# Patient Record
Sex: FEMALE | Race: ASIAN | NOT HISPANIC OR LATINO | Employment: UNEMPLOYED | URBAN - METROPOLITAN AREA
[De-identification: names, ages, dates, MRNs, and addresses within clinical notes are randomized per-mention and may not be internally consistent; named-entity substitution may affect disease eponyms.]

---

## 2023-07-26 LAB
CHLAMYDIA TRACH., AMPLIFIED: NEGATIVE
N. GONORRHEA, AMPLIFIED: NEGATIVE

## 2023-07-27 LAB — URINE CULTURE: NO GROWTH

## 2023-08-02 LAB
ABO GROUP (TYPE) IN BLOOD: NORMAL
ANTIBODY SCREEN: NORMAL
ERYTHROCYTE DISTRIBUTION WIDTH (RATIO) BY AUTOMATED COUNT: 11.5 % (ref 11.5–14.5)
ERYTHROCYTE MEAN CORPUSCULAR HEMOGLOBIN CONCENTRATION (G/DL) BY AUTOMATED: 33.3 G/DL (ref 32–36)
ERYTHROCYTE MEAN CORPUSCULAR VOLUME (FL) BY AUTOMATED COUNT: 98 FL (ref 80–100)
ERYTHROCYTES (10*6/UL) IN BLOOD BY AUTOMATED COUNT: 3.78 X10E12/L (ref 4–5.2)
HEMATOCRIT (%) IN BLOOD BY AUTOMATED COUNT: 36.9 % (ref 36–46)
HEMOGLOBIN (G/DL) IN BLOOD: 12.3 G/DL (ref 12–16)
HEPATITIS B VIRUS SURFACE AG PRESENCE IN SERUM: NONREACTIVE
HEPATITIS C VIRUS AB PRESENCE IN SERUM: NONREACTIVE
HIV 1/ 2 AG/AB SCREEN: NONREACTIVE
LEUKOCYTES (10*3/UL) IN BLOOD BY AUTOMATED COUNT: 9.5 X10E9/L (ref 4.4–11.3)
NRBC (PER 100 WBCS) BY AUTOMATED COUNT: 0 /100 WBC (ref 0–0)
PLATELETS (10*3/UL) IN BLOOD AUTOMATED COUNT: 216 X10E9/L (ref 150–450)
REFLEX ADDED, ANEMIA PANEL: ABNORMAL
RH FACTOR: NORMAL
RUBELLA VIRUS IGG AB: POSITIVE
SYPHILIS TOTAL AB: NONREACTIVE

## 2023-08-03 LAB
CHLAMYDIA TRACH., AMPLIFIED: NEGATIVE
N. GONORRHEA, AMPLIFIED: NEGATIVE

## 2023-08-07 LAB
HEMOGLOBIN A2: 2.8 %
HEMOGLOBIN A: 96.8 %
HEMOGLOBIN F: 0.4 %
HEMOGLOBIN IDENTIFICATION INTERPRETATION: NORMAL
PATH REVIEW-HGB IDENTIFICATION: NORMAL

## 2023-10-02 ENCOUNTER — ANCILLARY PROCEDURE (OUTPATIENT)
Dept: RADIOLOGY | Facility: CLINIC | Age: 28
End: 2023-10-02
Payer: COMMERCIAL

## 2023-10-02 DIAGNOSIS — Z32.01 PREGNANCY TEST POSITIVE (HHS-HCC): ICD-10-CM

## 2023-10-02 PROCEDURE — 76811 OB US DETAILED SNGL FETUS: CPT

## 2023-10-02 PROCEDURE — 76811 OB US DETAILED SNGL FETUS: CPT | Performed by: OBSTETRICS & GYNECOLOGY

## 2023-10-30 ENCOUNTER — ROUTINE PRENATAL (OUTPATIENT)
Dept: OBSTETRICS AND GYNECOLOGY | Facility: CLINIC | Age: 28
End: 2023-10-30
Payer: COMMERCIAL

## 2023-10-30 VITALS — WEIGHT: 154.6 LBS | BODY MASS INDEX: 27.39 KG/M2 | SYSTOLIC BLOOD PRESSURE: 120 MMHG | DIASTOLIC BLOOD PRESSURE: 68 MMHG

## 2023-10-30 DIAGNOSIS — Z34.02 ENCOUNTER FOR SUPERVISION OF NORMAL FIRST PREGNANCY IN SECOND TRIMESTER (HHS-HCC): Primary | ICD-10-CM

## 2023-10-30 DIAGNOSIS — N89.8 VAGINAL IRRITATION: ICD-10-CM

## 2023-10-30 PROCEDURE — 0501F PRENATAL FLOW SHEET: CPT | Performed by: OBSTETRICS & GYNECOLOGY

## 2023-10-30 PROCEDURE — 87205 SMEAR GRAM STAIN: CPT

## 2023-10-30 RX ORDER — FLUCONAZOLE 150 MG/1
150 TABLET ORAL ONCE
Qty: 2 TABLET | Refills: 0 | Status: SHIPPED | OUTPATIENT
Start: 2023-10-30 | End: 2023-10-30

## 2023-10-30 NOTE — PROGRESS NOTES
OB Follow up visit    ASSESSMENT & PLAN    Elda Sadler is a 28 y.o.  at 23w1d presenting for routine prenatal care    Problem List Items Addressed This Visit       Encounter for supervision of normal first pregnancy in second trimester - Primary    Overview     [x] Dating: LMP c/w 13 week ultrasound  [x] Initial BMI: 24  [x] Prenatal Labs: Rh+, Hgb 12.3, rubella immune  [x] Pap: ASCUS/HPV neg in 2023  [x] Aneuploidy Screening:  rr cfDNA  [x] Baby ASA: No  [x] Anatomy US: Normal  [] 1hr GCT at 24-28wks:   [] Tdap (27-36wks):  [x] Flu Shot: done  [x] COVID vaccine: done with primary vaccine series, declines booster  [] GBS at 36 wks:  [x] Breastfeeding: Yes  [x] PPBC: Planning for POP at 6 week visit  [] 39 weeks discussion of IOL vs. Expectant management:  [] Mode of delivery:            Current Assessment & Plan     GTT/syphilis/CBC ordered         Relevant Orders    CBC Anemia Panel With Reflex,Pregnancy    Glucose, 1 Hour Screen, Pregnancy    Syphilis Screen with Reflex    Vaginal irritation    Current Assessment & Plan     Exam consistent with yeast infection. Rx sent for diflucan. Vaginitis swab collected.         Relevant Medications    fluconazole (Diflucan) 150 mg tablet    Other Relevant Orders    Vaginitis Gram Stain For Bacterial Vaginosis + Yeast        Orders Placed This Encounter   Procedures    Vaginitis Gram Stain For Bacterial Vaginosis + Yeast     Order Specific Question:   Release result to MyChart     Answer:   Immediate [1]    CBC Anemia Panel With Reflex,Pregnancy     Standing Status:   Future     Standing Expiration Date:   10/30/2024     Order Specific Question:   Release result to MyChart     Answer:   Immediate [1]    Glucose, 1 Hour Screen, Pregnancy     Standing Status:   Future     Standing Expiration Date:   10/30/2024     Order Specific Question:   Release result to MyChart     Answer:   Immediate [1]    Syphilis Screen with Reflex     Standing Status:   Future      Standing Expiration Date:   10/30/2024     Order Specific Question:   Release result to [employer]     Answer:   Immediate [1]        RTC in 4 weeks    SUBJECTIVE    HPI: Elda Sadler is a 28 y.o.  at 23w1d here for RPNV. Denies contractions, bleeding, or LOF. Reports normal fetal movement. Patient reports vaginal itching on inner labia.     OBJECTIVE    Visit Vitals  /68   Wt 70.1 kg (154 lb 9.6 oz)   LMP 2023   BMI 27.39 kg/m²   OB Status Pregnant   Smoking Status Never   BSA 1.77 m²      Expected Total Weight Gain: 11.5 kg (25 lb)-16 kg (35 lb)   Pregravid BMI: 24.45    Michelle Bartlett MD

## 2023-10-31 LAB
CLUE CELLS VAG LPF-#/AREA: ABNORMAL /[LPF]
NUGENT SCORE: 1
YEAST VAG WET PREP-#/AREA: PRESENT

## 2023-11-28 ENCOUNTER — ROUTINE PRENATAL (OUTPATIENT)
Dept: OBSTETRICS AND GYNECOLOGY | Facility: CLINIC | Age: 28
End: 2023-11-28
Payer: COMMERCIAL

## 2023-11-28 ENCOUNTER — LAB (OUTPATIENT)
Dept: LAB | Facility: LAB | Age: 28
End: 2023-11-28
Payer: COMMERCIAL

## 2023-11-28 VITALS — DIASTOLIC BLOOD PRESSURE: 58 MMHG | WEIGHT: 162 LBS | BODY MASS INDEX: 28.7 KG/M2 | SYSTOLIC BLOOD PRESSURE: 116 MMHG

## 2023-11-28 DIAGNOSIS — Z23 NEED FOR DIPHTHERIA-TETANUS-PERTUSSIS (TDAP) VACCINE: Primary | ICD-10-CM

## 2023-11-28 DIAGNOSIS — Z34.02 ENCOUNTER FOR SUPERVISION OF NORMAL FIRST PREGNANCY IN SECOND TRIMESTER (HHS-HCC): ICD-10-CM

## 2023-11-28 PROBLEM — N89.8 VAGINAL IRRITATION: Status: RESOLVED | Noted: 2023-10-30 | Resolved: 2023-11-28

## 2023-11-28 LAB
ERYTHROCYTE [DISTWIDTH] IN BLOOD BY AUTOMATED COUNT: 11.8 % (ref 11.5–14.5)
GLUCOSE 1H P 50 G GLC PO SERPL-MCNC: 59 MG/DL
HCT VFR BLD AUTO: 34.1 % (ref 36–46)
HGB BLD-MCNC: 11.1 G/DL (ref 12–16)
MCH RBC QN AUTO: 32.8 PG (ref 26–34)
MCHC RBC AUTO-ENTMCNC: 32.6 G/DL (ref 32–36)
MCV RBC AUTO: 101 FL (ref 80–100)
NRBC BLD-RTO: 0 /100 WBCS (ref 0–0)
PLATELET # BLD AUTO: 194 X10*3/UL (ref 150–450)
RBC # BLD AUTO: 3.38 X10*6/UL (ref 4–5.2)
REFLEX ADDED, ANEMIA PANEL: NORMAL
T PALLIDUM AB SER QL: NONREACTIVE
WBC # BLD AUTO: 8.5 X10*3/UL (ref 4.4–11.3)

## 2023-11-28 PROCEDURE — 90715 TDAP VACCINE 7 YRS/> IM: CPT | Performed by: OBSTETRICS & GYNECOLOGY

## 2023-11-28 PROCEDURE — 36415 COLL VENOUS BLD VENIPUNCTURE: CPT

## 2023-11-28 PROCEDURE — 82947 ASSAY GLUCOSE BLOOD QUANT: CPT

## 2023-11-28 PROCEDURE — 85027 COMPLETE CBC AUTOMATED: CPT

## 2023-11-28 PROCEDURE — 90471 IMMUNIZATION ADMIN: CPT | Performed by: OBSTETRICS & GYNECOLOGY

## 2023-11-28 PROCEDURE — 0501F PRENATAL FLOW SHEET: CPT | Performed by: OBSTETRICS & GYNECOLOGY

## 2023-11-28 PROCEDURE — 86780 TREPONEMA PALLIDUM: CPT

## 2023-11-28 NOTE — PROGRESS NOTES
OB Follow up visit    ASSESSMENT & PLAN    Elda Sadler is a 28 y.o.  at 27w2d presenting for routine prenatal care    Problem List Items Addressed This Visit       Encounter for supervision of normal first pregnancy in second trimester    Overview     [x] Dating: LMP c/w 13 week ultrasound  [x] Initial BMI: 24  [x] Prenatal Labs: Rh+, Hgb 12.3, rubella immune  [x] Pap: ASCUS/HPV neg in 2023  [x] Aneuploidy Screening:  rr cfDNA  [x] Baby ASA: No  [x] Anatomy US: Normal  [x] 1hr GCT at 24-28wks: 59  [x] Tdap (27-36wks): 23  [x] Flu Shot: done  [x] COVID vaccine: done with primary vaccine series, declines booster  [] GBS at 36 wks:  [x] Breastfeeding: Yes  [x] PPBC: Planning for POP at 6 week visit  [] 39 weeks discussion of IOL vs. Expectant management:  [] Mode of delivery:             Other Visit Diagnoses       Need for diphtheria-tetanus-pertussis (Tdap) vaccine    -  Primary    Relevant Orders    Tdap vaccine, age 7 years and older (Completed)             Orders Placed This Encounter   Procedures    Tdap vaccine, age 7 years and older    CBC     Order Specific Question:   Release result to [employer]     Answer:   Immediate [1]    CBC Anemia Panel with Reflex, Pregnancy     Order Specific Question:   Release result to [employer]     Answer:   Immediate [1]        RTC in 3 weeks      SUBJECTIVE    HPI: Elda Sadler is a 28 y.o.  at 27w2d here for RPNV. Denies contractions, bleeding, or LOF. Reports normal fetal movement. Patient without complaints.    OBJECTIVE    Visit Vitals  /58   Wt 73.5 kg (162 lb)   LMP 2023   BMI 28.70 kg/m²   OB Status Pregnant   Smoking Status Never   BSA 1.81 m²        Michelle Bartlett MD

## 2023-12-19 ENCOUNTER — ROUTINE PRENATAL (OUTPATIENT)
Dept: OBSTETRICS AND GYNECOLOGY | Facility: CLINIC | Age: 28
End: 2023-12-19
Payer: COMMERCIAL

## 2023-12-19 VITALS — BODY MASS INDEX: 29.23 KG/M2 | DIASTOLIC BLOOD PRESSURE: 82 MMHG | SYSTOLIC BLOOD PRESSURE: 122 MMHG | WEIGHT: 165 LBS

## 2023-12-19 DIAGNOSIS — Z34.02 ENCOUNTER FOR SUPERVISION OF NORMAL FIRST PREGNANCY IN SECOND TRIMESTER (HHS-HCC): ICD-10-CM

## 2023-12-19 PROCEDURE — 0501F PRENATAL FLOW SHEET: CPT | Performed by: OBSTETRICS & GYNECOLOGY

## 2023-12-19 NOTE — PROGRESS NOTES
Bed: 09  Expected date:   Expected time:   Means of arrival: Personal Transportation  Comments:   OB Follow up visit    ASSESSMENT & PLAN    Elda Sadler is a 28 y.o.  at 30w2d presenting for routine prenatal care    Problem List Items Addressed This Visit       Encounter for supervision of normal first pregnancy in second trimester    Overview     [x] Dating: LMP c/w 13 week ultrasound  [x] Initial BMI: 24  [x] Prenatal Labs: Rh+, Hgb 12.3, rubella immune  [x] Pap: ASCUS/HPV neg in 2023  [x] Aneuploidy Screening:  rr cfDNA  [x] Baby ASA: No  [x] Anatomy US: Normal  [x] 1hr GCT at 24-28wks: 59  [x] Tdap (27-36wks): 23  [x] Flu Shot: done  [x] COVID vaccine: done with primary vaccine series, declines booster  [] GBS at 36 wks:  [x] Breastfeeding: Yes  [x] PPBC: Planning for POP at 6 week visit  [] 39 weeks discussion of IOL vs. Expectant management:  [] Mode of delivery:              RTC in 4 weeks      SUBJECTIVE    HPI: Elda Sadler is a 28 y.o.  at 30w2d here for RPNV. Denies contractions, bleeding, or LOF. Reports normal fetal movement. Patient reports heartburn. Has pepcid at home to try.      OBJECTIVE    Visit Vitals  /82   Wt 74.8 kg (165 lb)   LMP 2023   BMI 29.23 kg/m²   OB Status Pregnant   Smoking Status Never   BSA 1.82 m²        Michelle Bartlett MD

## 2024-01-02 ENCOUNTER — APPOINTMENT (OUTPATIENT)
Dept: OBSTETRICS AND GYNECOLOGY | Facility: CLINIC | Age: 29
End: 2024-01-02
Payer: COMMERCIAL

## 2024-01-05 ENCOUNTER — ROUTINE PRENATAL (OUTPATIENT)
Dept: OBSTETRICS AND GYNECOLOGY | Facility: CLINIC | Age: 29
End: 2024-01-05
Payer: COMMERCIAL

## 2024-01-05 VITALS — WEIGHT: 168 LBS | DIASTOLIC BLOOD PRESSURE: 60 MMHG | BODY MASS INDEX: 29.76 KG/M2 | SYSTOLIC BLOOD PRESSURE: 106 MMHG

## 2024-01-05 DIAGNOSIS — Z34.02 ENCOUNTER FOR SUPERVISION OF NORMAL FIRST PREGNANCY IN SECOND TRIMESTER (HHS-HCC): Primary | ICD-10-CM

## 2024-01-05 PROCEDURE — 0501F PRENATAL FLOW SHEET: CPT | Performed by: OBSTETRICS & GYNECOLOGY

## 2024-01-05 NOTE — PROGRESS NOTES
OB Follow up visit    ASSESSMENT & PLAN    Elda Sadler is a 28 y.o.  at 32w5d presenting for routine prenatal care    Problem List Items Addressed This Visit       Encounter for supervision of normal first pregnancy in second trimester - Primary    Overview     [x] Dating: LMP c/w 13 week ultrasound  [x] Initial BMI: 24  [x] Prenatal Labs: Rh+, Hgb 12.3, rubella immune  [x] Pap: ASCUS/HPV neg in 2023  [x] Aneuploidy Screening:  rr cfDNA  [x] Baby ASA: No  [x] Anatomy US: Normal  [x] 1hr GCT at 24-28wks: 59  [x] Tdap (27-36wks): 23  [x] Flu Shot: done  [x] COVID vaccine: done with primary vaccine series, declines booster  [] GBS at 36 wks:  [x] Breastfeeding: Yes  [x] PPBC: Planning for POP at 6 week visit  [] 39 weeks discussion of IOL vs. Expectant management: Discussed, may want IOL around NIYA  [x] Mode of delivery: Plan for            Relevant Orders    US MAC OB imaging order      Measuring slightly large - growth ultrasound ordered  Has a childbirth class tomorrow  RTC in 2 weeks      SUBJECTIVE    HPI: Elda Sadler is a 28 y.o.  at 32w5d here for RPNV. Denies contractions, bleeding, or LOF. Reports normal fetal movement. No new concerns today     OBJECTIVE    Visit Vitals  /60   Wt 76.2 kg (168 lb)   LMP 2023   BMI 29.76 kg/m²   OB Status Pregnant   Smoking Status Never   BSA 1.84 m²        Michelle Bartlett MD

## 2024-01-15 ENCOUNTER — ANCILLARY PROCEDURE (OUTPATIENT)
Dept: RADIOLOGY | Facility: CLINIC | Age: 29
End: 2024-01-15
Payer: COMMERCIAL

## 2024-01-15 DIAGNOSIS — Z34.90 PREGNANT (HHS-HCC): ICD-10-CM

## 2024-01-15 PROCEDURE — 76816 OB US FOLLOW-UP PER FETUS: CPT | Performed by: OBSTETRICS & GYNECOLOGY

## 2024-01-15 PROCEDURE — 76819 FETAL BIOPHYS PROFIL W/O NST: CPT | Performed by: OBSTETRICS & GYNECOLOGY

## 2024-01-15 PROCEDURE — 76816 OB US FOLLOW-UP PER FETUS: CPT

## 2024-01-16 ENCOUNTER — APPOINTMENT (OUTPATIENT)
Dept: OBSTETRICS AND GYNECOLOGY | Facility: CLINIC | Age: 29
End: 2024-01-16
Payer: COMMERCIAL

## 2024-01-19 ENCOUNTER — APPOINTMENT (OUTPATIENT)
Dept: OBSTETRICS AND GYNECOLOGY | Facility: CLINIC | Age: 29
End: 2024-01-19
Payer: COMMERCIAL

## 2024-01-19 ENCOUNTER — ROUTINE PRENATAL (OUTPATIENT)
Dept: OBSTETRICS AND GYNECOLOGY | Facility: CLINIC | Age: 29
End: 2024-01-19
Payer: COMMERCIAL

## 2024-01-19 VITALS — BODY MASS INDEX: 30.08 KG/M2 | DIASTOLIC BLOOD PRESSURE: 66 MMHG | WEIGHT: 169.8 LBS | SYSTOLIC BLOOD PRESSURE: 102 MMHG

## 2024-01-19 DIAGNOSIS — Z34.02 ENCOUNTER FOR SUPERVISION OF NORMAL FIRST PREGNANCY IN SECOND TRIMESTER (HHS-HCC): ICD-10-CM

## 2024-01-19 PROCEDURE — 0501F PRENATAL FLOW SHEET: CPT | Performed by: OBSTETRICS & GYNECOLOGY

## 2024-01-19 ASSESSMENT — ENCOUNTER SYMPTOMS
RESPIRATORY NEGATIVE: 0
GASTROINTESTINAL NEGATIVE: 0
ALLERGIC/IMMUNOLOGIC NEGATIVE: 0
NEUROLOGICAL NEGATIVE: 0
HEMATOLOGIC/LYMPHATIC NEGATIVE: 0
PSYCHIATRIC NEGATIVE: 0
MUSCULOSKELETAL NEGATIVE: 0
EYES NEGATIVE: 0
CARDIOVASCULAR NEGATIVE: 0
CONSTITUTIONAL NEGATIVE: 0
ENDOCRINE NEGATIVE: 0

## 2024-01-19 NOTE — PROGRESS NOTES
OB Follow up visit    ASSESSMENT & PLAN    Elda Sadler is a 28 y.o.  at 34w5d presenting for routine prenatal care    Problem List Items Addressed This Visit       Encounter for supervision of normal first pregnancy in second trimester    Overview     [x] Dating: LMP c/w 13 week ultrasound  [x] Initial BMI: 24  [x] Prenatal Labs: Rh+, Hgb 12.3, rubella immune  [x] Pap: ASCUS/HPV neg in 2023  [x] Aneuploidy Screening:  rr cfDNA  [x] Baby ASA: No  [x] Anatomy US: Normal  [x] 1hr GCT at 24-28wks: 59  [x] Tdap (27-36wks): 23  [x] Flu Shot: done  [x] COVID vaccine: done with primary vaccine series, declines booster  [] GBS at 36 wks:  [x] Breastfeeding: Yes  [x] PPBC: Planning for POP at 6 week visit  [] 39 weeks discussion of IOL vs. Expectant management: Discussed, may want IOL around NIYA  [x] Mode of delivery: Plan for               RTC in 1 weeks      SUBJECTIVE    HPI: Elda Sadler is a 28 y.o.  at 34w5d here for RPNV. Denies contractions, bleeding, or LOF. Reports normal fetal movement. Patient without concerns      OBJECTIVE    Visit Vitals  /66   Wt 77 kg (169 lb 12.8 oz)   LMP 2023   BMI 30.08 kg/m²   OB Status Pregnant   Smoking Status Never   BSA 1.85 m²        Michelle Bartlett MD

## 2024-01-30 ENCOUNTER — APPOINTMENT (OUTPATIENT)
Dept: OBSTETRICS AND GYNECOLOGY | Facility: CLINIC | Age: 29
End: 2024-01-30
Payer: COMMERCIAL

## 2024-02-02 ENCOUNTER — ROUTINE PRENATAL (OUTPATIENT)
Dept: OBSTETRICS AND GYNECOLOGY | Facility: CLINIC | Age: 29
End: 2024-02-02
Payer: COMMERCIAL

## 2024-02-02 VITALS — DIASTOLIC BLOOD PRESSURE: 60 MMHG | SYSTOLIC BLOOD PRESSURE: 102 MMHG | BODY MASS INDEX: 30.47 KG/M2 | WEIGHT: 172 LBS

## 2024-02-02 DIAGNOSIS — Z34.02 ENCOUNTER FOR SUPERVISION OF NORMAL FIRST PREGNANCY IN SECOND TRIMESTER (HHS-HCC): ICD-10-CM

## 2024-02-02 DIAGNOSIS — Z3A.36 36 WEEKS GESTATION OF PREGNANCY (HHS-HCC): ICD-10-CM

## 2024-02-02 PROCEDURE — 0501F PRENATAL FLOW SHEET: CPT | Performed by: OBSTETRICS & GYNECOLOGY

## 2024-02-02 NOTE — PROGRESS NOTES
OB Follow up visit    ASSESSMENT & PLAN    Elda Sadler is a 28 y.o.  at 36w5d presenting for routine prenatal care    Problem List Items Addressed This Visit       Encounter for supervision of normal first pregnancy in second trimester    Overview     [x] Dating: LMP c/w 13 week ultrasound  [x] Initial BMI: 24  [x] Prenatal Labs: Rh+, Hgb 12.3, rubella immune  [x] Pap: ASCUS/HPV neg in 2023  [x] Aneuploidy Screening:  rr cfDNA  [x] Baby ASA: No  [x] Anatomy US: Normal  [x] 1hr GCT at 24-28wks: 59  [x] Tdap (27-36wks): 23  [x] Flu Shot: done  [x] COVID vaccine: done with primary vaccine series, declines booster  [] GBS at 36 wks: collected 24  [x] Breastfeeding: Yes  [x] PPBC: Planning for POP at 6 week visit  [x] 39 weeks discussion of IOL vs. Expectant management: Discussed, may want IOL around NIYA, request at [place]  [x] Mode of delivery: Plan for             Other Visit Diagnoses       36 weeks gestation of pregnancy        Relevant Orders    Group B Streptococcus (GBS) Prenatal Screen, Culture             Orders Placed This Encounter   Procedures    Group B Streptococcus (GBS) Prenatal Screen, Culture     Order Specific Question:   Release result to [employer]     Answer:   Immediate [1]        RTC in 1 week      SUBJECTIVE    HPI: Elda Sadler is a 28 y.o.  at 36w5d here for RPNV. Denies contractions, bleeding, or LOF. Reports normal fetal movement. Patient without complaints. Went to breastfeeding class.       OBJECTIVE    Visit Vitals  /60   Wt 78 kg (172 lb)   LMP 2023   BMI 30.47 kg/m²   OB Status Pregnant   Smoking Status Never   BSA 1.86 m²        Michelle Bartlett MD

## 2024-02-05 ENCOUNTER — TELEPHONE (OUTPATIENT)
Dept: OBSTETRICS AND GYNECOLOGY | Facility: CLINIC | Age: 29
End: 2024-02-05
Payer: COMMERCIAL

## 2024-02-06 ENCOUNTER — ROUTINE PRENATAL (OUTPATIENT)
Dept: OBSTETRICS AND GYNECOLOGY | Facility: CLINIC | Age: 29
End: 2024-02-06
Payer: COMMERCIAL

## 2024-02-06 VITALS — BODY MASS INDEX: 30.72 KG/M2 | SYSTOLIC BLOOD PRESSURE: 112 MMHG | WEIGHT: 173.4 LBS | DIASTOLIC BLOOD PRESSURE: 68 MMHG

## 2024-02-06 DIAGNOSIS — Z34.02 ENCOUNTER FOR SUPERVISION OF NORMAL FIRST PREGNANCY IN SECOND TRIMESTER (HHS-HCC): ICD-10-CM

## 2024-02-06 PROCEDURE — 87081 CULTURE SCREEN ONLY: CPT

## 2024-02-06 PROCEDURE — 0501F PRENATAL FLOW SHEET: CPT | Performed by: OBSTETRICS & GYNECOLOGY

## 2024-02-06 ASSESSMENT — ENCOUNTER SYMPTOMS
EYES NEGATIVE: 0
CONSTITUTIONAL NEGATIVE: 0
PSYCHIATRIC NEGATIVE: 0
GASTROINTESTINAL NEGATIVE: 0
ENDOCRINE NEGATIVE: 0
ALLERGIC/IMMUNOLOGIC NEGATIVE: 0
MUSCULOSKELETAL NEGATIVE: 0
NEUROLOGICAL NEGATIVE: 0
RESPIRATORY NEGATIVE: 0
CARDIOVASCULAR NEGATIVE: 0
HEMATOLOGIC/LYMPHATIC NEGATIVE: 0

## 2024-02-06 NOTE — PROGRESS NOTES
OB Follow up visit    ASSESSMENT & PLAN    Elda Sadler is a 28 y.o.  at 37w2d presenting for routine prenatal care    Problem List Items Addressed This Visit       Encounter for supervision of normal first pregnancy in second trimester    Overview     [x] Dating: LMP c/w 13 week ultrasound  [x] Initial BMI: 24  [x] Prenatal Labs: Rh+, Hgb 12.3, rubella immune  [x] Pap: ASCUS/HPV neg in 2023  [x] Aneuploidy Screening:  rr cfDNA  [x] Baby ASA: No  [x] Anatomy US: Normal  [x] 1hr GCT at 24-28wks: 59  [x] Tdap (27-36wks): 23  [x] Flu Shot: done  [x] COVID vaccine: done with primary vaccine series, declines booster  [] GBS at 36 wks: collected 24 - lost in transit, new sample collected 24  [x] Breastfeeding: Yes  [x] PPBC: Planning for POP at 6 week visit  [x] 39 weeks discussion of IOL vs. Expectant management: Discussed, may want IOL around NIYA, request at [place]  [x] Mode of delivery: Plan for            Relevant Orders    Group B Streptococcus (GBS) Prenatal Screen, Culture        Orders Placed This Encounter   Procedures    Group B Streptococcus (GBS) Prenatal Screen, Culture     Order Specific Question:   Release result to [employer]     Answer:   Immediate [1]        RTC in 1 week      SUBJECTIVE    HPI: Elda Sadler is a 28 y.o.  at 37w2d here for RPNV. Occasional contractions, no bleeding, or LOF. Reports normal fetal movement. Patient has no new concerns today      OBJECTIVE    Visit Vitals  /68   Wt 78.7 kg (173 lb 6.4 oz)   LMP 2023   BMI 30.72 kg/m²   OB Status Pregnant   Smoking Status Never   BSA 1.87 m²        Michelle Bartlett MD

## 2024-02-08 LAB — GP B STREP GENITAL QL CULT: NORMAL

## 2024-02-12 ENCOUNTER — ROUTINE PRENATAL (OUTPATIENT)
Dept: OBSTETRICS AND GYNECOLOGY | Facility: CLINIC | Age: 29
End: 2024-02-12
Payer: COMMERCIAL

## 2024-02-12 VITALS — DIASTOLIC BLOOD PRESSURE: 64 MMHG | BODY MASS INDEX: 30.82 KG/M2 | WEIGHT: 174 LBS | SYSTOLIC BLOOD PRESSURE: 118 MMHG

## 2024-02-12 DIAGNOSIS — Z34.02 ENCOUNTER FOR SUPERVISION OF NORMAL FIRST PREGNANCY IN SECOND TRIMESTER (HHS-HCC): Primary | ICD-10-CM

## 2024-02-12 PROCEDURE — 0501F PRENATAL FLOW SHEET: CPT | Performed by: OBSTETRICS & GYNECOLOGY

## 2024-02-12 NOTE — PROGRESS NOTES
OB Follow up visit    ASSESSMENT & PLAN    Elda Sadler is a 28 y.o.  at 38w1d presenting for routine prenatal care    Problem List Items Addressed This Visit       Encounter for supervision of normal first pregnancy in second trimester - Primary    Overview     [x] Dating: LMP c/w 13 week ultrasound  [x] Initial BMI: 24  [x] Prenatal Labs: Rh+, Hgb 12.3, rubella immune  [x] Pap: ASCUS/HPV neg in 2023  [x] Aneuploidy Screening:  rr cfDNA  [x] Baby ASA: No  [x] Anatomy US: Normal  [x] 1hr GCT at 24-28wks: 59  [x] Tdap (27-36wks): 23  [x] Flu Shot: done  [x] COVID vaccine: done with primary vaccine series, declines booster  [x] GBS at 36 wks: collected 24, negative  [x] Breastfeeding: Yes  [x] PPBC: Planning for POP at 6 week visit  [x] 39 weeks discussion of IOL vs. Expectant management: Discussed, desires IOL between 40-41 weeks. Requested , she will be 40w2d.  [x] Mode of delivery: Plan for                RTC in 1 week      SUBJECTIVE    HPI: Elda Sadler is a 28 y.o.  at 38w1d here for RPNV. Reports occasional contractions, denies bleeding and LOF. Reports normal fetal movement. Patient without new concerns.    OBJECTIVE    Visit Vitals  /64   Wt 78.9 kg (174 lb)   LMP 2023   BMI 30.82 kg/m²   OB Status Pregnant   Smoking Status Never   BSA 1.87 m²        Michelle Bartlett MD

## 2024-02-13 ENCOUNTER — APPOINTMENT (OUTPATIENT)
Dept: OBSTETRICS AND GYNECOLOGY | Facility: CLINIC | Age: 29
End: 2024-02-13
Payer: COMMERCIAL

## 2024-02-20 ENCOUNTER — ROUTINE PRENATAL (OUTPATIENT)
Dept: OBSTETRICS AND GYNECOLOGY | Facility: CLINIC | Age: 29
End: 2024-02-20
Payer: COMMERCIAL

## 2024-02-20 VITALS — BODY MASS INDEX: 30.82 KG/M2 | WEIGHT: 174 LBS | DIASTOLIC BLOOD PRESSURE: 70 MMHG | SYSTOLIC BLOOD PRESSURE: 114 MMHG

## 2024-02-20 DIAGNOSIS — Z34.02 ENCOUNTER FOR SUPERVISION OF NORMAL FIRST PREGNANCY IN SECOND TRIMESTER (HHS-HCC): Primary | ICD-10-CM

## 2024-02-20 PROCEDURE — 0501F PRENATAL FLOW SHEET: CPT | Performed by: OBSTETRICS & GYNECOLOGY

## 2024-02-20 NOTE — PROGRESS NOTES
OB Follow up visit    ASSESSMENT & PLAN    Elda Sadler is a 28 y.o.  at 39w2d presenting for routine prenatal care    Problem List Items Addressed This Visit       Encounter for supervision of normal first pregnancy in second trimester - Primary    Overview     [x] Dating: LMP c/w 13 week ultrasound  [x] Initial BMI: 24  [x] Prenatal Labs: Rh+, Hgb 12.3, rubella immune  [x] Pap: ASCUS/HPV neg in 2023  [x] Aneuploidy Screening:  rr cfDNA  [x] Baby ASA: No  [x] Anatomy US: Normal  [x] 1hr GCT at 24-28wks: 59  [x] Tdap (27-36wks): 23  [x] Flu Shot: done  [x] COVID vaccine: done with primary vaccine series, declines booster  [x] GBS at 36 wks: collected 24, negative  [x] Breastfeeding: Yes  [x] PPBC: Planning for POP at 6 week visit  [x] 39 weeks discussion of IOL vs. Expectant management: Discussed, desires IOL between 40-41 weeks. Scheduled  at 8am, she will be 40w2d.  [x] Mode of delivery: Plan for              RTC in 1 weeks      SUBJECTIVE    HPI: Elda Sadler is a 28 y.o.  at 39w2d here for RPNV. Irregular contractions, denies bleeding, or LOF. Reports normal fetal movement. Patient without new concerns.       OBJECTIVE    Visit Vitals  /70   Wt 78.9 kg (174 lb)   LMP 2023   BMI 30.82 kg/m²   OB Status Pregnant   Smoking Status Never   BSA 1.87 m²        Michelle Bartlett MD

## 2024-02-23 DIAGNOSIS — Z34.02 ENCOUNTER FOR SUPERVISION OF NORMAL FIRST PREGNANCY IN SECOND TRIMESTER (HHS-HCC): Primary | ICD-10-CM

## 2024-02-26 ENCOUNTER — HOSPITAL ENCOUNTER (INPATIENT)
Facility: HOSPITAL | Age: 29
LOS: 3 days | Discharge: HOME | End: 2024-02-29
Attending: OBSTETRICS & GYNECOLOGY | Admitting: OBSTETRICS & GYNECOLOGY
Payer: COMMERCIAL

## 2024-02-26 ENCOUNTER — TELEPHONE (OUTPATIENT)
Dept: OBSTETRICS AND GYNECOLOGY | Facility: CLINIC | Age: 29
End: 2024-02-26

## 2024-02-26 ENCOUNTER — ANESTHESIA (OUTPATIENT)
Dept: OBSTETRICS AND GYNECOLOGY | Facility: HOSPITAL | Age: 29
End: 2024-02-26
Payer: COMMERCIAL

## 2024-02-26 ENCOUNTER — ANESTHESIA EVENT (OUTPATIENT)
Dept: OBSTETRICS AND GYNECOLOGY | Facility: HOSPITAL | Age: 29
End: 2024-02-26
Payer: COMMERCIAL

## 2024-02-26 ENCOUNTER — APPOINTMENT (OUTPATIENT)
Dept: OBSTETRICS AND GYNECOLOGY | Facility: CLINIC | Age: 29
End: 2024-02-26
Payer: COMMERCIAL

## 2024-02-26 PROBLEM — K21.9 GASTROESOPHAGEAL REFLUX DISEASE: Status: ACTIVE | Noted: 2024-02-26

## 2024-02-26 LAB
ABO GROUP (TYPE) IN BLOOD: NORMAL
ANTIBODY SCREEN: NORMAL
ERYTHROCYTE [DISTWIDTH] IN BLOOD BY AUTOMATED COUNT: 11.7 % (ref 11.5–14.5)
HCT VFR BLD AUTO: 35.1 % (ref 36–46)
HGB BLD-MCNC: 12.4 G/DL (ref 12–16)
MCH RBC QN AUTO: 33.5 PG (ref 26–34)
MCHC RBC AUTO-ENTMCNC: 35.3 G/DL (ref 32–36)
MCV RBC AUTO: 95 FL (ref 80–100)
NRBC BLD-RTO: 0 /100 WBCS (ref 0–0)
PLATELET # BLD AUTO: 156 X10*3/UL (ref 150–450)
RBC # BLD AUTO: 3.7 X10*6/UL (ref 4–5.2)
RH FACTOR (ANTIGEN D): NORMAL
TREPONEMA PALLIDUM IGG+IGM AB [PRESENCE] IN SERUM OR PLASMA BY IMMUNOASSAY: NONREACTIVE
WBC # BLD AUTO: 10.1 X10*3/UL (ref 4.4–11.3)

## 2024-02-26 PROCEDURE — 3E0E77Z INTRODUCTION OF ELECTROLYTIC AND WATER BALANCE SUBSTANCE INTO PRODUCTS OF CONCEPTION, VIA NATURAL OR ARTIFICIAL OPENING: ICD-10-PCS | Performed by: OBSTETRICS & GYNECOLOGY

## 2024-02-26 PROCEDURE — 59050 FETAL MONITOR W/REPORT: CPT

## 2024-02-26 PROCEDURE — 36415 COLL VENOUS BLD VENIPUNCTURE: CPT

## 2024-02-26 PROCEDURE — 86780 TREPONEMA PALLIDUM: CPT

## 2024-02-26 PROCEDURE — 99214 OFFICE O/P EST MOD 30 MIN: CPT

## 2024-02-26 PROCEDURE — 85027 COMPLETE CBC AUTOMATED: CPT

## 2024-02-26 PROCEDURE — 1120000001 HC OB PRIVATE ROOM DAILY

## 2024-02-26 PROCEDURE — 7210000002 HC LABOR PER HOUR

## 2024-02-26 PROCEDURE — 2500000001 HC RX 250 WO HCPCS SELF ADMINISTERED DRUGS (ALT 637 FOR MEDICARE OP)

## 2024-02-26 PROCEDURE — 86901 BLOOD TYPING SEROLOGIC RH(D): CPT

## 2024-02-26 PROCEDURE — 3E0P7VZ INTRODUCTION OF HORMONE INTO FEMALE REPRODUCTIVE, VIA NATURAL OR ARTIFICIAL OPENING: ICD-10-PCS | Performed by: OBSTETRICS & GYNECOLOGY

## 2024-02-26 RX ORDER — METOCLOPRAMIDE HYDROCHLORIDE 5 MG/ML
10 INJECTION INTRAMUSCULAR; INTRAVENOUS EVERY 6 HOURS PRN
Status: DISCONTINUED | OUTPATIENT
Start: 2024-02-26 | End: 2024-02-27

## 2024-02-26 RX ORDER — TERBUTALINE SULFATE 1 MG/ML
0.25 INJECTION SUBCUTANEOUS ONCE AS NEEDED
Status: DISCONTINUED | OUTPATIENT
Start: 2024-02-26 | End: 2024-02-27 | Stop reason: HOSPADM

## 2024-02-26 RX ORDER — HYDRALAZINE HYDROCHLORIDE 20 MG/ML
5 INJECTION INTRAMUSCULAR; INTRAVENOUS ONCE AS NEEDED
Status: DISCONTINUED | OUTPATIENT
Start: 2024-02-26 | End: 2024-02-27 | Stop reason: HOSPADM

## 2024-02-26 RX ORDER — CARBOPROST TROMETHAMINE 250 UG/ML
250 INJECTION, SOLUTION INTRAMUSCULAR ONCE AS NEEDED
Status: DISCONTINUED | OUTPATIENT
Start: 2024-02-26 | End: 2024-02-27 | Stop reason: HOSPADM

## 2024-02-26 RX ORDER — METHYLERGONOVINE MALEATE 0.2 MG/ML
0.2 INJECTION INTRAVENOUS ONCE AS NEEDED
Status: COMPLETED | OUTPATIENT
Start: 2024-02-26 | End: 2024-02-27

## 2024-02-26 RX ORDER — SODIUM CHLORIDE, SODIUM LACTATE, POTASSIUM CHLORIDE, CALCIUM CHLORIDE 600; 310; 30; 20 MG/100ML; MG/100ML; MG/100ML; MG/100ML
125 INJECTION, SOLUTION INTRAVENOUS CONTINUOUS
Status: DISCONTINUED | OUTPATIENT
Start: 2024-02-26 | End: 2024-02-27

## 2024-02-26 RX ORDER — ONDANSETRON HYDROCHLORIDE 2 MG/ML
4 INJECTION, SOLUTION INTRAVENOUS EVERY 6 HOURS PRN
Status: DISCONTINUED | OUTPATIENT
Start: 2024-02-26 | End: 2024-02-27

## 2024-02-26 RX ORDER — TRANEXAMIC ACID 100 MG/ML
1000 INJECTION, SOLUTION INTRAVENOUS ONCE AS NEEDED
Status: DISCONTINUED | OUTPATIENT
Start: 2024-02-26 | End: 2024-02-27 | Stop reason: HOSPADM

## 2024-02-26 RX ORDER — LOPERAMIDE HYDROCHLORIDE 2 MG/1
4 CAPSULE ORAL EVERY 2 HOUR PRN
Status: DISCONTINUED | OUTPATIENT
Start: 2024-02-26 | End: 2024-02-27 | Stop reason: HOSPADM

## 2024-02-26 RX ORDER — ONDANSETRON 4 MG/1
4 TABLET, FILM COATED ORAL EVERY 6 HOURS PRN
Status: DISCONTINUED | OUTPATIENT
Start: 2024-02-26 | End: 2024-02-27

## 2024-02-26 RX ORDER — MISOPROSTOL 200 UG/1
800 TABLET ORAL ONCE AS NEEDED
Status: DISCONTINUED | OUTPATIENT
Start: 2024-02-26 | End: 2024-02-27 | Stop reason: HOSPADM

## 2024-02-26 RX ORDER — METOCLOPRAMIDE 10 MG/1
10 TABLET ORAL EVERY 6 HOURS PRN
Status: DISCONTINUED | OUTPATIENT
Start: 2024-02-26 | End: 2024-02-27

## 2024-02-26 RX ORDER — LABETALOL HYDROCHLORIDE 5 MG/ML
20 INJECTION, SOLUTION INTRAVENOUS ONCE AS NEEDED
Status: DISCONTINUED | OUTPATIENT
Start: 2024-02-26 | End: 2024-02-27 | Stop reason: HOSPADM

## 2024-02-26 RX ORDER — OXYTOCIN 10 [USP'U]/ML
10 INJECTION, SOLUTION INTRAMUSCULAR; INTRAVENOUS ONCE AS NEEDED
Status: DISCONTINUED | OUTPATIENT
Start: 2024-02-26 | End: 2024-02-27 | Stop reason: HOSPADM

## 2024-02-26 RX ORDER — OXYTOCIN/0.9 % SODIUM CHLORIDE 30/500 ML
60 PLASTIC BAG, INJECTION (ML) INTRAVENOUS ONCE AS NEEDED
Status: COMPLETED | OUTPATIENT
Start: 2024-02-26 | End: 2024-02-27

## 2024-02-26 RX ORDER — NIFEDIPINE 10 MG/1
10 CAPSULE ORAL ONCE AS NEEDED
Status: DISCONTINUED | OUTPATIENT
Start: 2024-02-26 | End: 2024-02-27 | Stop reason: HOSPADM

## 2024-02-26 RX ORDER — LIDOCAINE HYDROCHLORIDE 10 MG/ML
30 INJECTION INFILTRATION; PERINEURAL ONCE AS NEEDED
Status: DISCONTINUED | OUTPATIENT
Start: 2024-02-26 | End: 2024-02-27 | Stop reason: HOSPADM

## 2024-02-26 RX ADMIN — MISOPROSTOL 25 MCG: 100 TABLET ORAL at 16:32

## 2024-02-26 RX ADMIN — MISOPROSTOL 25 MCG: 100 TABLET ORAL at 20:21

## 2024-02-26 SDOH — SOCIAL STABILITY: SOCIAL INSECURITY: PHYSICAL ABUSE: DENIES

## 2024-02-26 SDOH — ECONOMIC STABILITY: FOOD INSECURITY: WITHIN THE PAST 12 MONTHS, THE FOOD YOU BOUGHT JUST DIDN'T LAST AND YOU DIDN'T HAVE MONEY TO GET MORE.: NEVER TRUE

## 2024-02-26 SDOH — ECONOMIC STABILITY: FOOD INSECURITY: WITHIN THE PAST 12 MONTHS, YOU WORRIED THAT YOUR FOOD WOULD RUN OUT BEFORE YOU GOT MONEY TO BUY MORE.: NEVER TRUE

## 2024-02-26 SDOH — HEALTH STABILITY: PHYSICAL HEALTH: ON AVERAGE, HOW MANY MINUTES DO YOU ENGAGE IN EXERCISE AT THIS LEVEL?: 30 MIN

## 2024-02-26 SDOH — SOCIAL STABILITY: SOCIAL INSECURITY: HAVE YOU HAD THOUGHTS OF HARMING ANYONE ELSE?: NO

## 2024-02-26 SDOH — SOCIAL STABILITY: SOCIAL INSECURITY: HAS ANYONE EVER THREATENED TO HURT YOUR FAMILY OR YOUR PETS?: NO

## 2024-02-26 SDOH — HEALTH STABILITY: MENTAL HEALTH: NON-SPECIFIC ACTIVE SUICIDAL THOUGHTS (PAST 1 MONTH): NO

## 2024-02-26 SDOH — HEALTH STABILITY: MENTAL HEALTH: HAVE YOU USED ANY SUBSTANCES (CANABIS, COCAINE, HEROIN, HALLUCINOGENS, INHALANTS, ETC.) IN THE PAST 12 MONTHS?: NO

## 2024-02-26 SDOH — SOCIAL STABILITY: SOCIAL INSECURITY: ARE YOU OR HAVE YOU BEEN THREATENED OR ABUSED PHYSICALLY, EMOTIONALLY, OR SEXUALLY BY ANYONE?: NO

## 2024-02-26 SDOH — HEALTH STABILITY: MENTAL HEALTH: SUICIDAL BEHAVIOR (LIFETIME): NO

## 2024-02-26 SDOH — SOCIAL STABILITY: SOCIAL INSECURITY: VERBAL ABUSE: DENIES

## 2024-02-26 SDOH — HEALTH STABILITY: MENTAL HEALTH

## 2024-02-26 SDOH — SOCIAL STABILITY: SOCIAL INSECURITY: ABUSE SCREEN: ADULT

## 2024-02-26 SDOH — SOCIAL STABILITY: SOCIAL INSECURITY: DO YOU FEEL ANYONE HAS EXPLOITED OR TAKEN ADVANTAGE OF YOU FINANCIALLY OR OF YOUR PERSONAL PROPERTY?: NO

## 2024-02-26 SDOH — HEALTH STABILITY: MENTAL HEALTH: WISH TO BE DEAD (PAST 1 MONTH): NO

## 2024-02-26 SDOH — HEALTH STABILITY: MENTAL HEALTH: WERE YOU ABLE TO COMPLETE ALL THE BEHAVIORAL HEALTH SCREENINGS?: YES

## 2024-02-26 SDOH — HEALTH STABILITY: PHYSICAL HEALTH: ON AVERAGE, HOW MANY DAYS PER WEEK DO YOU ENGAGE IN MODERATE TO STRENUOUS EXERCISE (LIKE A BRISK WALK)?: 4 DAYS

## 2024-02-26 SDOH — HEALTH STABILITY: MENTAL HEALTH
STRENGTHS (MUST CHOOSE TWO): SUPPORT FROM ORGANIZED COMMUNITY;SUPPORT FROM FRIENDS;STABLE DOMESTIC SITUATION;STABLE HOUSING;SUPPORT FROM FAMILY;SPIRITUALITY

## 2024-02-26 SDOH — SOCIAL STABILITY: SOCIAL INSECURITY: ARE THERE ANY APPARENT SIGNS OF INJURIES/BEHAVIORS THAT COULD BE RELATED TO ABUSE/NEGLECT?: NO

## 2024-02-26 SDOH — ECONOMIC STABILITY: HOUSING INSECURITY: DO YOU FEEL UNSAFE GOING BACK TO THE PLACE WHERE YOU ARE LIVING?: NO

## 2024-02-26 SDOH — SOCIAL STABILITY: SOCIAL INSECURITY: DOES ANYONE TRY TO KEEP YOU FROM HAVING/CONTACTING OTHER FRIENDS OR DOING THINGS OUTSIDE YOUR HOME?: NO

## 2024-02-26 SDOH — HEALTH STABILITY: MENTAL HEALTH: HAVE YOU USED ANY PRESCRIPTION DRUGS OTHER THAN PRESCRIBED IN THE PAST 12 MONTHS?: NO

## 2024-02-26 ASSESSMENT — ACTIVITIES OF DAILY LIVING (ADL)
HEARING - RIGHT EAR: FUNCTIONAL
ADEQUATE_TO_COMPLETE_ADL: YES
WALKS IN HOME: INDEPENDENT
LACK_OF_TRANSPORTATION: NO
GROOMING: INDEPENDENT
JUDGMENT_ADEQUATE_SAFELY_COMPLETE_DAILY_ACTIVITIES: YES
PATIENT'S MEMORY ADEQUATE TO SAFELY COMPLETE DAILY ACTIVITIES?: YES
BATHING: INDEPENDENT
HEARING - LEFT EAR: FUNCTIONAL
DRESSING YOURSELF: INDEPENDENT
TOILETING: INDEPENDENT
FEEDING YOURSELF: INDEPENDENT

## 2024-02-26 ASSESSMENT — EDINBURGH POSTNATAL DEPRESSION SCALE (EPDS)
I HAVE FELT SCARED OR PANICKY FOR NO GOOD REASON: NO, NOT AT ALL
I HAVE BEEN ANXIOUS OR WORRIED FOR NO GOOD REASON: NO, NOT AT ALL
I HAVE BEEN SO UNHAPPY THAT I HAVE BEEN CRYING: NO, NEVER
I HAVE BEEN ABLE TO LAUGH AND SEE THE FUNNY SIDE OF THINGS: AS MUCH AS I ALWAYS COULD
THE THOUGHT OF HARMING MYSELF HAS OCCURRED TO ME: NEVER
I HAVE LOOKED FORWARD WITH ENJOYMENT TO THINGS: AS MUCH AS I EVER DID
I HAVE BEEN SO UNHAPPY THAT I HAVE HAD DIFFICULTY SLEEPING: NOT AT ALL
TOTAL SCORE: 1
THINGS HAVE BEEN GETTING ON TOP OF ME: NO, I HAVE BEEN COPING AS WELL AS EVER
I HAVE BLAMED MYSELF UNNECESSARILY WHEN THINGS WENT WRONG: NOT VERY OFTEN
I HAVE FELT SAD OR MISERABLE: NO, NOT AT ALL

## 2024-02-26 ASSESSMENT — PATIENT HEALTH QUESTIONNAIRE - PHQ9
SUM OF ALL RESPONSES TO PHQ9 QUESTIONS 1 & 2: 0
2. FEELING DOWN, DEPRESSED OR HOPELESS: NOT AT ALL
1. LITTLE INTEREST OR PLEASURE IN DOING THINGS: NOT AT ALL

## 2024-02-26 ASSESSMENT — LIFESTYLE VARIABLES
HOW OFTEN DO YOU HAVE A DRINK CONTAINING ALCOHOL: NEVER
AUDIT-C TOTAL SCORE: 0
SKIP TO QUESTIONS 9-10: 1
HOW OFTEN DO YOU HAVE 6 OR MORE DRINKS ON ONE OCCASION: NEVER
HOW MANY STANDARD DRINKS CONTAINING ALCOHOL DO YOU HAVE ON A TYPICAL DAY: PATIENT DOES NOT DRINK
AUDIT-C TOTAL SCORE: 0

## 2024-02-26 ASSESSMENT — PAIN SCALES - GENERAL
PAINLEVEL_OUTOF10: 0 - NO PAIN

## 2024-02-26 ASSESSMENT — SOCIAL DETERMINANTS OF HEALTH (SDOH)
DO YOU BELONG TO ANY CLUBS OR ORGANIZATIONS SUCH AS CHURCH GROUPS UNIONS, FRATERNAL OR ATHLETIC GROUPS, OR SCHOOL GROUPS?: NO
HOW OFTEN DO YOU GET TOGETHER WITH FRIENDS OR RELATIVES?: ONCE A WEEK
WITHIN THE LAST YEAR, HAVE YOU BEEN KICKED, HIT, SLAPPED, OR OTHERWISE PHYSICALLY HURT BY YOUR PARTNER OR EX-PARTNER?: NO
HOW OFTEN DO YOU ATTENT MEETINGS OF THE CLUB OR ORGANIZATION YOU BELONG TO?: NEVER
WITHIN THE LAST YEAR, HAVE YOU BEEN AFRAID OF YOUR PARTNER OR EX-PARTNER?: NO
WITHIN THE LAST YEAR, HAVE YOU BEEN HUMILIATED OR EMOTIONALLY ABUSED IN OTHER WAYS BY YOUR PARTNER OR EX-PARTNER?: NO
HOW OFTEN DO YOU ATTEND CHURCH OR RELIGIOUS SERVICES?: 1 TO 4 TIMES PER YEAR
WITHIN THE LAST YEAR, HAVE TO BEEN RAPED OR FORCED TO HAVE ANY KIND OF SEXUAL ACTIVITY BY YOUR PARTNER OR EX-PARTNER?: NO
IN A TYPICAL WEEK, HOW MANY TIMES DO YOU TALK ON THE PHONE WITH FAMILY, FRIENDS, OR NEIGHBORS?: THREE TIMES A WEEK
IN A TYPICAL WEEK, HOW MANY TIMES DO YOU TALK ON THE PHONE WITH FAMILY, FRIENDS, OR NEIGHBORS?: MORE THAN THREE TIMES A WEEK

## 2024-02-26 NOTE — CARE PLAN
The patient's goals for the shift include admission    The clinical goals for the shift include Healthy mom and baby    Pt is a 27 yo  @ 40.1 weeks gestation admitted to LDR for rrIOL. Pt resting in bed with CRB and misoprostol in place. Pt reporting cramping with some relief with hot packs and position changes. VSS and assessments WNL. Spouse at bedside supportive of pt and participating in care.

## 2024-02-26 NOTE — INDIVIDUALIZED OVERALL PLAN OF CARE NOTE
Cervical Exam  Dilation: 1  Effacement (%): 40  Fetal Station: -3  Method: Manual  OB Examiner: MD Kulwant  Fetal Assessment  Movement: Present  Mode: External US (applied)  Baseline Fetal Heart Rate (bpm): 130 bpm  Baseline Classification: Normal  Variability: Moderate (Between 6 and 25 BPM)  Pattern: Accelerations   Fetal Decelerations: No  Contraction Frequency: 5-6    IOL   - CRB placed bimanually, 60cc of saline inserted, confirmed with gentle traction   - Cyto #1 placed  - Cat I, CEFM    Dary Miranda MD

## 2024-02-26 NOTE — H&P
Obstetrical Admission History and Physical     Elda Sadler is a 28 y.o. . 40w1d that presents for leakage of fluid    Chief Complaint: Leakage/Loss of Fluid (9am)    Assessment/Plan      R/o PROM   - Continuous clear leakage of fluid since last night   - Contractions q25 mins   - Negative for pooling, ferning and nitrazine on SSE  - OSITO 17 cm  - CE: /-3  - Low c/f PROM at this time given above findings. IOL scheduled for tomorrow, however admitted for rrIOL in setting of gestational age of 40.1 wga     IOL  - admitted to L&D, consented, scanned - cephalic  - planning to initiate induction with CRB and cyto  - T&S and CBC on admission  - delivery plan: plan for vaginal delivery, patient counseled on induction of labor and possibility of c/s for failure of induction or non-reassuring fetal status    Fetal status   - NST reactive on admission, for cEFM during IOL   - GBS negative, therefore intrapartum PCN not indicated    Post-partum  - BCM: POPs at 6 weeks   - Feeds: plans to breast feed    Pt was seen with Dr. Blum and Dr. Mayfield.     Principal Problem:    Labor and delivery indication for care or intervention      Pregnancy Problems (from 23 to present)       Problem Noted Resolved    Encounter for supervision of normal first pregnancy in second trimester 10/30/2023 by Michelle Bartlett MD No    Priority:  Medium      Overview Addendum 2024 10:08 AM by Michelle Bartlett MD     [x] Dating: LMP c/w 13 week ultrasound  [x] Initial BMI: 24  [x] Prenatal Labs: Rh+, Hgb 12.3, rubella immune  [x] Pap: ASCUS/HPV neg in 2023  [x] Aneuploidy Screening:  rr cfDNA  [x] Baby ASA: No  [x] Anatomy US: Normal  [x] 1hr GCT at 24-28wks: 59  [x] Tdap (27-36wks): 23  [x] Flu Shot: done  [x] COVID vaccine: done with primary vaccine series, declines booster  [x] GBS at 36 wks: collected 24, negative  [x] Breastfeeding: Yes  [x] PPBC: Planning for POP at 6 week visit  [x] 39 weeks  discussion of IOL vs. Expectant management: Discussed, desires IOL between 40-41 weeks. Scheduled  at 8am, she will be 40w2d.  [x] Mode of delivery: Plan for           Subjective   Elda is here complaining of leakage of fluids since last night but increased at 9 am this morning that is clear, continuous, reports no smell. Pt states that leakage of fluid has filled up 1 pad. Pt admits to contractions q25 mins. Pt denies abdominal pain, vaginal pruritus, increased vaginal discharge. Good fetal movement. Denies vaginal bleeding, Having contractions q 25 minutes. Admits to leakage of fluid. Pt was scheduled for an induction tomorrow for 24. Negative for pooling, nitrazine, ferning.     No complications during this pregnancy.        Obstetrical History   OB History    Para Term  AB Living   1 0 0 0 0 0   SAB IAB Ectopic Multiple Live Births   0 0 0 0 0      # Outcome Date GA Lbr Emmett/2nd Weight Sex Delivery Anes PTL Lv   1 Current                Past Medical History  Denies     Past Surgical History   Right inguinal hernia repair, 2016    Social History  Social History     Tobacco Use    Smoking status: Never    Smokeless tobacco: Never   Substance Use Topics    Alcohol use: Not Currently     Substance and Sexual Activity   Drug Use Never       Allergies  Patient has no known allergies.     Medications  Medications Prior to Admission   Medication Sig Dispense Refill Last Dose    prenatal no115/iron/folic acid (PRENATAL 19 ORAL) Take by mouth.          Objective    Last Vitals  Temp Pulse Resp BP MAP O2 Sat   36.6 °C (97.9 °F) 102 18 124/75   98 %     Physical Examination  Constitutional: no visible distress, alert and cooperative  Eyes: clear sclera  Head/Neck: normocephalic  Respiratory/Thorax: normal respiratory effort on RA  Cardiovascular: regular rate   Gastrointestinal: abdomen soft, gravid, non-tender to palpation, without rebound or guarding  Musculoskeletal: grossly normal  ROM  Extremities: BLEs symmetrical   Neurological: no gross deficits appreciated   Psychological: Appropriate mood and behavior  Skin: warm, dry, no lesions    SSE: negative for ferning, Nitrazine and pooling     Cervical Exam  Dilation: 1  Effacement (%): 40  Fetal Station: -3  Method: Manual  OB Examiner: MD Kulwant  Fetal Assessment  Movement: Present  Mode: External US  Baseline Fetal Heart Rate (bpm): 130 bpm  Baseline Classification: Normal  Variability: Moderate (Between 6 and 25 BPM)  Pattern: Accelerations   Fetal Decelerations: No  Contraction Frequency: 5-6    Lab Review  Labs in chart were reviewed.  No visits with results within 1 Day(s) from this visit.   Latest known visit with results is:   Routine Prenatal on 02/06/2024   Component Date Value Ref Range Status    Group B Strep Screen 02/06/2024 No Group B Streptococcus (GBS) isolated   Final

## 2024-02-26 NOTE — TELEPHONE ENCOUNTER
Patient states when she woke up this morning, she noted her undergarments were wet.  She changed and applied a pad.  She reports clear to yellow liquid.  Contractions 45min apart.  Patient advised to report to L&D for r/o ROM.  She was agreeable.  I left a message on the triage line.

## 2024-02-26 NOTE — ANESTHESIA PREPROCEDURE EVALUATION
Patient: Elda Sadler    Evaluation Method: In-person visit    Procedure Information    Date: 02/26/24  Procedure: Labor Consult         Relevant Problems   Anesthesia (within normal limits)      Cardiovascular (within normal limits)      Endocrine (within normal limits)      GI   (+) Gastroesophageal reflux disease (Pregnancy related, well controlled with occasional tums)      /Renal (within normal limits)      Neuro/Psych (within normal limits)      Pulmonary (within normal limits)      GI/Hepatic (within normal limits)      Hematology (within normal limits)      Musculoskeletal (within normal limits)       Clinical information reviewed:   Tobacco  Allergies  Meds   Med Hx  Surg Hx   Fam Hx          NPO Detail:  No data recorded     OB/GYN     Physical Exam    Airway  Mallampati: II  TM distance: >3 FB  Neck ROM: full     Cardiovascular    Dental    Pulmonary    Abdominal            Anesthesia Plan    History of general anesthesia?: yes  History of complications of general anesthesia?: no    ASA 2     epidural   (GA and epidural R/B discussed.  Questions welcomed.  Pt agrees with plan.)  Anesthetic plan and risks discussed with patient.  Use of blood products discussed with patient who consented to blood products.    Plan discussed with CAA and attending.

## 2024-02-26 NOTE — PROGRESS NOTES
Intrapartum Progress Note    Assessment/Plan   Elda Sadler is a 28 y.o.  at 40w1d. NIYA: 2024, by Last Menstrual Period admitted for rrIOL    IOL  - Most recent SVE: -3  - CRB currently in place. Cyto #1 placed at 1630. Will place #2 at 1930 if CRB still in place   - For Pitocin and AROM when appropriate   - Initial c/f PROM on admission- neg x3, OSITO 17  - delivery plan: plan for vaginal delivery, patient counseled on induction of labor and possibility of c/s for failure of induction or non-reassuring fetal status    Maternal Well-Being  - ppBC: POPs  - Desires to breastfeed  - GBS neg     Fetal Well-Being   - EFW: 7#    Seen & dw Dr. George Crane MD  PGY-1, Obstetrics & Gynecology   [facility]        Principal Problem:    Labor and delivery indication for care or intervention  Active Problems:    Gastroesophageal reflux disease    Pregnancy Problems (from 23 to present)       Problem Noted Resolved    Labor and delivery indication for care or intervention 2024 by Joe Blum MD No    Priority:  Medium      Encounter for supervision of normal first pregnancy in second trimester 10/30/2023 by Michelle Bartlett MD No    Priority:  Medium      Overview Addendum 2024 10:08 AM by Michelle Bartlett MD     [x] Dating: LMP c/w 13 week ultrasound  [x] Initial BMI: 24  [x] Prenatal Labs: Rh+, Hgb 12.3, rubella immune  [x] Pap: ASCUS/HPV neg in 2023  [x] Aneuploidy Screening:  rr cfDNA  [x] Baby ASA: No  [x] Anatomy US: Normal  [x] 1hr GCT at 24-28wks: 59  [x] Tdap (27-36wks): 23  [x] Flu Shot: done  [x] COVID vaccine: done with primary vaccine series, declines booster  [x] GBS at 36 wks: collected 24, negative  [x] Breastfeeding: Yes  [x] PPBC: Planning for POP at 6 week visit  [x] 39 weeks discussion of IOL vs. Expectant management: Discussed, desires IOL between 40-41 weeks. Scheduled  at 8am, she will be  40w2d.  [x] Mode of delivery: Plan for            Vaginal irritation 10/30/2023 by Michelle Bartlett MD 2023 by Michelle Bartlett MD            Subjective   Patient resting with partner at bedside. CRB still in place    Objective   Last Vitals:  Temp Pulse Resp BP MAP Pulse Ox   36.5 °C (97.7 °F) 84 18 122/80   98 %     Vitals Min/Max Last 24 Hours:  Temp  Min: 36.5 °C (97.7 °F)  Max: 36.8 °C (98.2 °F)  Pulse  Min: 84  Max: 102  Resp  Min: 17  Max: 18  BP  Min: 109/64  Max: 135/84    Intake/Output:  No intake or output data in the 24 hours ending 24 2353    Physical Examination:  Genitourinary:      Deferred  Cardiovascular:      Rate and Rhythm: Normal rate.   Pulmonary:      Effort: Pulmonary effort is normal.      Breath sounds: Normal breath sounds.   Abdominal:      Palpations: Abdomen is soft.      Comments: Gravid, non-tender to palpation  Neurological:      General: No focal deficit present.      Mental Status: She is alert.   Skin:     General: Skin is warm and dry.   Psychiatric:         Mood and Affect: Mood normal.         Behavior: Behavior normal.         Lab Review:  Lab Results   Component Value Date    WBC 10.1 2024    HGB 12.4 2024    HCT 35.1 (L) 2024     2024

## 2024-02-27 ENCOUNTER — APPOINTMENT (OUTPATIENT)
Dept: OBSTETRICS AND GYNECOLOGY | Facility: CLINIC | Age: 29
End: 2024-02-27
Payer: COMMERCIAL

## 2024-02-27 ENCOUNTER — ANESTHESIA EVENT (OUTPATIENT)
Dept: OBSTETRICS AND GYNECOLOGY | Facility: HOSPITAL | Age: 29
End: 2024-02-27
Payer: COMMERCIAL

## 2024-02-27 ENCOUNTER — ANESTHESIA (OUTPATIENT)
Dept: OBSTETRICS AND GYNECOLOGY | Facility: HOSPITAL | Age: 29
End: 2024-02-27
Payer: COMMERCIAL

## 2024-02-27 PROCEDURE — 10907ZC DRAINAGE OF AMNIOTIC FLUID, THERAPEUTIC FROM PRODUCTS OF CONCEPTION, VIA NATURAL OR ARTIFICIAL OPENING: ICD-10-PCS | Performed by: OBSTETRICS & GYNECOLOGY

## 2024-02-27 PROCEDURE — 51701 INSERT BLADDER CATHETER: CPT

## 2024-02-27 PROCEDURE — 01967 NEURAXL LBR ANES VAG DLVR: CPT | Performed by: ANESTHESIOLOGY

## 2024-02-27 PROCEDURE — 2500000005 HC RX 250 GENERAL PHARMACY W/O HCPCS

## 2024-02-27 PROCEDURE — 59400 OBSTETRICAL CARE: CPT

## 2024-02-27 PROCEDURE — 2500000004 HC RX 250 GENERAL PHARMACY W/ HCPCS (ALT 636 FOR OP/ED)

## 2024-02-27 PROCEDURE — 59409 OBSTETRICAL CARE: CPT | Performed by: OBSTETRICS & GYNECOLOGY

## 2024-02-27 PROCEDURE — 0KQM0ZZ REPAIR PERINEUM MUSCLE, OPEN APPROACH: ICD-10-PCS | Performed by: OBSTETRICS & GYNECOLOGY

## 2024-02-27 PROCEDURE — 1100000001 HC PRIVATE ROOM DAILY

## 2024-02-27 PROCEDURE — 7100000016 HC LABOR RECOVERY PER HOUR

## 2024-02-27 PROCEDURE — 2500000001 HC RX 250 WO HCPCS SELF ADMINISTERED DRUGS (ALT 637 FOR MEDICARE OP)

## 2024-02-27 PROCEDURE — 2500000005 HC RX 250 GENERAL PHARMACY W/O HCPCS: Performed by: STUDENT IN AN ORGANIZED HEALTH CARE EDUCATION/TRAINING PROGRAM

## 2024-02-27 PROCEDURE — 7210000002 HC LABOR PER HOUR

## 2024-02-27 PROCEDURE — 10H07YZ INSERTION OF OTHER DEVICE INTO PRODUCTS OF CONCEPTION, VIA NATURAL OR ARTIFICIAL OPENING: ICD-10-PCS | Performed by: OBSTETRICS & GYNECOLOGY

## 2024-02-27 RX ORDER — DIPHENHYDRAMINE HCL 25 MG
25 CAPSULE ORAL EVERY 6 HOURS PRN
Status: DISCONTINUED | OUTPATIENT
Start: 2024-02-27 | End: 2024-02-29 | Stop reason: HOSPADM

## 2024-02-27 RX ORDER — OXYTOCIN/0.9 % SODIUM CHLORIDE 30/500 ML
2-30 PLASTIC BAG, INJECTION (ML) INTRAVENOUS CONTINUOUS
Status: DISCONTINUED | OUTPATIENT
Start: 2024-02-27 | End: 2024-02-27

## 2024-02-27 RX ORDER — ONDANSETRON 4 MG/1
4 TABLET, FILM COATED ORAL EVERY 6 HOURS PRN
Status: DISCONTINUED | OUTPATIENT
Start: 2024-02-27 | End: 2024-02-29 | Stop reason: HOSPADM

## 2024-02-27 RX ORDER — METHYLERGONOVINE MALEATE 0.2 MG/ML
0.2 INJECTION INTRAVENOUS ONCE AS NEEDED
Status: DISCONTINUED | OUTPATIENT
Start: 2024-02-27 | End: 2024-02-29 | Stop reason: HOSPADM

## 2024-02-27 RX ORDER — FENTANYL/BUPIVACAINE/NS/PF 2MCG/ML-.1
PLASTIC BAG, INJECTION (ML) INJECTION AS NEEDED
Status: DISCONTINUED | OUTPATIENT
Start: 2024-02-27 | End: 2024-02-27

## 2024-02-27 RX ORDER — POLYETHYLENE GLYCOL 3350 17 G/17G
17 POWDER, FOR SOLUTION ORAL 2 TIMES DAILY PRN
Status: DISCONTINUED | OUTPATIENT
Start: 2024-02-27 | End: 2024-02-29 | Stop reason: HOSPADM

## 2024-02-27 RX ORDER — IBUPROFEN 600 MG/1
600 TABLET ORAL EVERY 6 HOURS
Status: DISCONTINUED | OUTPATIENT
Start: 2024-02-27 | End: 2024-02-29 | Stop reason: HOSPADM

## 2024-02-27 RX ORDER — LABETALOL HYDROCHLORIDE 5 MG/ML
20 INJECTION, SOLUTION INTRAVENOUS ONCE AS NEEDED
Status: DISCONTINUED | OUTPATIENT
Start: 2024-02-27 | End: 2024-02-29 | Stop reason: HOSPADM

## 2024-02-27 RX ORDER — NIFEDIPINE 10 MG/1
10 CAPSULE ORAL ONCE AS NEEDED
Status: DISCONTINUED | OUTPATIENT
Start: 2024-02-27 | End: 2024-02-29 | Stop reason: HOSPADM

## 2024-02-27 RX ORDER — OXYTOCIN 10 [USP'U]/ML
10 INJECTION, SOLUTION INTRAMUSCULAR; INTRAVENOUS ONCE AS NEEDED
Status: DISCONTINUED | OUTPATIENT
Start: 2024-02-27 | End: 2024-02-29 | Stop reason: HOSPADM

## 2024-02-27 RX ORDER — FENTANYL/BUPIVACAINE/NS/PF 2MCG/ML-.1
0-54 PLASTIC BAG, INJECTION (ML) INJECTION CONTINUOUS
Status: DISCONTINUED | OUTPATIENT
Start: 2024-02-27 | End: 2024-02-27

## 2024-02-27 RX ORDER — CARBOPROST TROMETHAMINE 250 UG/ML
250 INJECTION, SOLUTION INTRAMUSCULAR ONCE AS NEEDED
Status: DISCONTINUED | OUTPATIENT
Start: 2024-02-27 | End: 2024-02-29 | Stop reason: HOSPADM

## 2024-02-27 RX ORDER — FENTANYL/BUPIVACAINE/NS/PF 2MCG/ML-.1
PLASTIC BAG, INJECTION (ML) INJECTION CONTINUOUS PRN
Status: DISCONTINUED | OUTPATIENT
Start: 2024-02-27 | End: 2024-02-27

## 2024-02-27 RX ORDER — BISACODYL 10 MG/1
10 SUPPOSITORY RECTAL DAILY PRN
Status: DISCONTINUED | OUTPATIENT
Start: 2024-02-27 | End: 2024-02-29 | Stop reason: HOSPADM

## 2024-02-27 RX ORDER — LOPERAMIDE HYDROCHLORIDE 2 MG/1
4 CAPSULE ORAL EVERY 2 HOUR PRN
Status: DISCONTINUED | OUTPATIENT
Start: 2024-02-27 | End: 2024-02-29 | Stop reason: HOSPADM

## 2024-02-27 RX ORDER — DIPHENHYDRAMINE HYDROCHLORIDE 50 MG/ML
25 INJECTION INTRAMUSCULAR; INTRAVENOUS EVERY 6 HOURS PRN
Status: DISCONTINUED | OUTPATIENT
Start: 2024-02-27 | End: 2024-02-29 | Stop reason: HOSPADM

## 2024-02-27 RX ORDER — ADHESIVE BANDAGE
10 BANDAGE TOPICAL
Status: DISCONTINUED | OUTPATIENT
Start: 2024-02-27 | End: 2024-02-29 | Stop reason: HOSPADM

## 2024-02-27 RX ORDER — MISOPROSTOL 200 UG/1
800 TABLET ORAL ONCE AS NEEDED
Status: DISCONTINUED | OUTPATIENT
Start: 2024-02-27 | End: 2024-02-29 | Stop reason: HOSPADM

## 2024-02-27 RX ORDER — OXYTOCIN/0.9 % SODIUM CHLORIDE 30/500 ML
60 PLASTIC BAG, INJECTION (ML) INTRAVENOUS ONCE AS NEEDED
Status: DISCONTINUED | OUTPATIENT
Start: 2024-02-27 | End: 2024-02-29 | Stop reason: HOSPADM

## 2024-02-27 RX ORDER — ONDANSETRON HYDROCHLORIDE 2 MG/ML
4 INJECTION, SOLUTION INTRAVENOUS EVERY 6 HOURS PRN
Status: DISCONTINUED | OUTPATIENT
Start: 2024-02-27 | End: 2024-02-29 | Stop reason: HOSPADM

## 2024-02-27 RX ORDER — SIMETHICONE 80 MG
80 TABLET,CHEWABLE ORAL 4 TIMES DAILY PRN
Status: DISCONTINUED | OUTPATIENT
Start: 2024-02-27 | End: 2024-02-29 | Stop reason: HOSPADM

## 2024-02-27 RX ORDER — ACETAMINOPHEN 325 MG/1
975 TABLET ORAL EVERY 6 HOURS
Status: DISCONTINUED | OUTPATIENT
Start: 2024-02-27 | End: 2024-02-29 | Stop reason: HOSPADM

## 2024-02-27 RX ORDER — HYDRALAZINE HYDROCHLORIDE 20 MG/ML
5 INJECTION INTRAMUSCULAR; INTRAVENOUS ONCE AS NEEDED
Status: DISCONTINUED | OUTPATIENT
Start: 2024-02-27 | End: 2024-02-29 | Stop reason: HOSPADM

## 2024-02-27 RX ORDER — LIDOCAINE 560 MG/1
1 PATCH PERCUTANEOUS; TOPICAL; TRANSDERMAL
Status: DISCONTINUED | OUTPATIENT
Start: 2024-02-27 | End: 2024-02-29 | Stop reason: HOSPADM

## 2024-02-27 RX ORDER — TRANEXAMIC ACID 100 MG/ML
1000 INJECTION, SOLUTION INTRAVENOUS ONCE AS NEEDED
Status: DISCONTINUED | OUTPATIENT
Start: 2024-02-27 | End: 2024-02-29 | Stop reason: HOSPADM

## 2024-02-27 RX ADMIN — SODIUM CHLORIDE, POTASSIUM CHLORIDE, SODIUM LACTATE AND CALCIUM CHLORIDE 125 ML/HR: 600; 310; 30; 20 INJECTION, SOLUTION INTRAVENOUS at 00:30

## 2024-02-27 RX ADMIN — LIDOCAINE HYDROCHLORIDE 4 ML: 10; .005 INJECTION, SOLUTION EPIDURAL; INFILTRATION; INTRACAUDAL; PERINEURAL at 11:48

## 2024-02-27 RX ADMIN — SODIUM CHLORIDE, POTASSIUM CHLORIDE, SODIUM LACTATE AND CALCIUM CHLORIDE 500 ML: 600; 310; 30; 20 INJECTION, SOLUTION INTRAVENOUS at 07:02

## 2024-02-27 RX ADMIN — Medication 4 ML: at 02:01

## 2024-02-27 RX ADMIN — ACETAMINOPHEN 975 MG: 325 TABLET ORAL at 13:18

## 2024-02-27 RX ADMIN — Medication 14 ML/HR: at 02:06

## 2024-02-27 RX ADMIN — IBUPROFEN 600 MG: 600 TABLET, FILM COATED ORAL at 13:19

## 2024-02-27 RX ADMIN — Medication 60 MILLI-UNITS/MIN: at 11:44

## 2024-02-27 RX ADMIN — IBUPROFEN 600 MG: 600 TABLET, FILM COATED ORAL at 19:02

## 2024-02-27 RX ADMIN — Medication 4 ML: at 01:57

## 2024-02-27 RX ADMIN — SODIUM CHLORIDE, POTASSIUM CHLORIDE, SODIUM LACTATE AND CALCIUM CHLORIDE 500 ML: 600; 310; 30; 20 INJECTION, SOLUTION INTRAVENOUS at 01:35

## 2024-02-27 RX ADMIN — METHYLERGONOVINE MALEATE 0.2 MG: 0.2 INJECTION, SOLUTION INTRAMUSCULAR; INTRAVENOUS at 12:13

## 2024-02-27 RX ADMIN — ACETAMINOPHEN 975 MG: 325 TABLET ORAL at 19:02

## 2024-02-27 RX ADMIN — Medication 2 MILLI-UNITS/MIN: at 00:48

## 2024-02-27 RX ADMIN — Medication 1 EACH: at 17:12

## 2024-02-27 RX ADMIN — Medication 2 ML: at 02:05

## 2024-02-27 RX ADMIN — BENZOCAINE AND LEVOMENTHOL 1 APPLICATION: 200; 5 SPRAY TOPICAL at 17:12

## 2024-02-27 ASSESSMENT — PAIN SCALES - GENERAL
PAINLEVEL_OUTOF10: 0 - NO PAIN
PAINLEVEL_OUTOF10: 0 - NO PAIN
PAINLEVEL_OUTOF10: 7
PAINLEVEL_OUTOF10: 0 - NO PAIN
PAINLEVEL_OUTOF10: 1
PAINLEVEL_OUTOF10: 0 - NO PAIN
PAINLEVEL_OUTOF10: 4
PAINLEVEL_OUTOF10: 0 - NO PAIN
PAINLEVEL_OUTOF10: 9
PAINLEVEL_OUTOF10: 4
PAINLEVEL_OUTOF10: 0 - NO PAIN
PAINLEVEL_OUTOF10: 6
PAINLEVEL_OUTOF10: 2

## 2024-02-27 ASSESSMENT — PAIN - FUNCTIONAL ASSESSMENT: PAIN_FUNCTIONAL_ASSESSMENT: 0-10

## 2024-02-27 ASSESSMENT — PAIN DESCRIPTION - LOCATION: LOCATION: ABDOMEN

## 2024-02-27 NOTE — CARE PLAN
Problem: Vaginal Birth or  Section  Goal: Fetal and maternal status remain reassuring during the birth process  Outcome: Progressing  Goal: Tolerate CRB for IOL placement maintenance until dislodgement/removal 12hrs after placement  Outcome: Progressing  Goal: Prevention of malpresentation/labor dystocia through delivery  Outcome: Progressing  Goal: Demonstrates labor coping techniques through delivery  Outcome: Progressing  Goal: Minimal s/sx of HDP and BP<160/110  Outcome: Progressing  Goal: No s/sx of infection through recovery  Outcome: Progressing  Goal: No s/sx of hemorrhage through recovery  Outcome: Progressing

## 2024-02-27 NOTE — SIGNIFICANT EVENT
"Labor Progress Note    Subjective:   Patient feeling more intermittent rectal pressure    Objective:  Vitals:  /84   Pulse 102   Temp 37.2 °C (99 °F) (Temporal)   Resp 20   Ht 1.6 m (5' 3\")   Wt 79.1 kg (174 lb 6.1 oz)   LMP 05/21/2023   SpO2 (!) 90%   BMI 30.89 kg/m²   Cervical Exam  Dilation: 7  Effacement (%): 80  Fetal Station: -2  Method: Manual  OB Examiner: Mingo  Fetal Assessment  Movement: Present  Mode: External US  Baseline Fetal Heart Rate (bpm): 135 bpm  Baseline Classification: Normal  Variability: Moderate (Between 6 and 25 BPM)  Pattern: Accelerations, Variable decelerations  FHR Category: Category I  Multiple Births: No   Fetal Decelerations: No  Contraction Frequency: 2-3      A/P:    Active Labor  - CEFM Cat II currently given variables, improving with amnioinfusion   - continue pitocin per protocol  - Continue position changes, as tolerated  - GBS negative  - recheck in 2 hours or if indicated by maternal or fetal status    Discussed with Dr. Fernando Aguila MD PGY-1      "

## 2024-02-27 NOTE — SIGNIFICANT EVENT
AROM    CRB out. Pit started at 0050, now at 6. Pt with epidural infusing, not feeling ctx. Amenable to AROM at this time.    SVE: 5/60/-3, latent. AROMd clear  cEFM: Baseline 125, pos accels, neg decels, mod david  [place]: ctx q5 mins    A/P:  - In latent labor with pit on, and now s/o AROM at 0310.  - cat I tracing  - Cont to monitor cervical change    D/w Dr. Scotty Anderson MD PGY-2

## 2024-02-27 NOTE — SIGNIFICANT EVENT
"Labor Check      S: Patient amenable to cyto #2        O:  /79   Pulse 97   Temp 36.4 °C (97.5 °F) (Temporal)   Resp 16   Ht 1.6 m (5' 3\")   Wt 79.1 kg (174 lb 6.1 oz)   LMP 05/21/2023   SpO2 (!) 94%   BMI 30.89 kg/m²      SVE: 1/40/-3     NST:   Baseline: 140  Variability: mod  Accels:+  Decels: -    [place]: irritable          A/P  -  Latent Labor.   - CEFM Cat I currently  - CRB remains in place  - s/p Cyto #2   - Continue position changes, as tolerated  - GBS negative  - Recheck as clinically indicated by maternal or fetal status or PRN   - Anticipate NSVB.      Pt. d/w Dr. Judie Crane MD  PGY-1, Obstetrics & Gynecology   [facility]       "

## 2024-02-27 NOTE — LACTATION NOTE
Lactation Consultant Note  Lactation Consultation  Reason for Consult: Initial assessment  Consultant Name: JUSTIN Hong    Maternal Information  Has mother  before?: No  Infant to breast within first 2 hours of birth?: Yes    Maternal Assessment  Breast Assessment: Large  Nipple Assessment: Intact, Erect with stimulation, Flat, Red/inflamed, Sore  Alterations in Nipple Condition: Stage I - pain or irritation with no skin break down  Areola Assessment: Normal    Infant Assessment  Infant Behavior: Awake, Crying, Readiness to feed  Infant Assessment: Sublingual frenulum (comment), Other (Comment) (unable to sustain cupping past gums, slipping off breast)    Feeding Assessment  Nutrition Source: Breastmilk  Feeding Method: Nursing at the breast, Feeding expressed breastmilk, Syringe feeding  Feeding Position: Baby led, Straddle, Cross - cradle, Mother demonstrates good positioning, Nipple to nose  Suck/Feeding: Unsustained, Clenching/biting, Cheeks dimple during sucking, Nipple shield used  Latch Assessment: Latch achieved after repeated attempts, Wide open mouth < 160, Pain during feeding, Latch is painful, Sucks with long jaw movement, Chin moves in rhythmic motion, Nipple - slurping/pulls/nibbles, Nipple slides back and forth within baby's mouth, Maximum assistance is needed, Cries while latching    LATCH TOOL  Latch: Repeated attempts, hold nipple in mouth, stimulate to suck  Audible Swallowing: A few with stimulation  Type of Nipple: Everted (After stimulation)  Comfort (Breast/Nipple): Filling, red/small blisters/bruises, mild/moderate discomfort  Hold (Positioning): Full assist, staff holds infant at breast  LATCH Score: 5    Breast Pump  Pump: Hospital grade electric pump  Frequency: 8-10 times per day  Duration: 15-20 minutes per session  Breast Shield Size and Type: 21 mm    Other OB Lactation Tools       Patient Follow-up  Inpatient Lactation Follow-up Needed : Yes  Outpatient Lactation Follow-up:  Recommended    Other OB Lactation Documentation  Infant Risk Factors: Poor or painful latch / restricted feedings, Other (comment) (tight frenulum to the gums)    Recommendations/Summary  Assisted with latching infant. Attempted in cross cradle and koala hold. Infant able to latch in koala hold but mother reports pain at 8/10. Her nipples are already  bright red from the few latches since birth. Attempted  with a 16 and 20 mm shield, pain remained an 8/10. Discussed options. Mother would like to pump/hand express, and utilize [facility] as needed until pediatricians can assess the frenulum. Reviewed pumping protocol and amounts to feed infant. All questions answered at this time. She has a pump for home.

## 2024-02-27 NOTE — PROGRESS NOTES
Intrapartum Progress Note    Assessment/Plan   Elda Sadler is a 28 y.o.  at 40w2d. NIYA: 2024, by Last Menstrual Period.     Active Labor  - CEFM Cat II currently given variables, improving with amnioinfusion   - pit paused at 0700, will restart now  - Continue position changes, as tolerated  - GBS negative  - Recheck as clinically indicated by maternal or fetal status or PRN     1 L O2 requirement  -O2 requirement while sleeping  -asymptomatic  -wean as tolerated while she is awake  -continue to monitor    Post-partum  - BCM: POPs at 6 weeks   - Feeds: plans to breast feed    Pt seen and discussed with Dr. Fernando Aguila MD PGY-1      Principal Problem:    Labor and delivery indication for care or intervention  Active Problems:    Gastroesophageal reflux disease    Pregnancy Problems (from 23 to present)       Problem Noted Resolved    Labor and delivery indication for care or intervention 2024 by Joe Blum MD No    Priority:  Medium      Encounter for supervision of normal first pregnancy in second trimester 10/30/2023 by Michelle Bartlett MD No    Priority:  Medium      Overview Addendum 2024 10:08 AM by Michelle Bartlett MD     [x] Dating: LMP c/w 13 week ultrasound  [x] Initial BMI: 24  [x] Prenatal Labs: Rh+, Hgb 12.3, rubella immune  [x] Pap: ASCUS/HPV neg in 2023  [x] Aneuploidy Screening:  rr cfDNA  [x] Baby ASA: No  [x] Anatomy US: Normal  [x] 1hr GCT at 24-28wks: 59  [x] Tdap (27-36wks): 23  [x] Flu Shot: done  [x] COVID vaccine: done with primary vaccine series, declines booster  [x] GBS at 36 wks: collected 24, negative  [x] Breastfeeding: Yes  [x] PPBC: Planning for POP at 6 week visit  [x] 39 weeks discussion of IOL vs. Expectant management: Discussed, desires IOL between 40-41 weeks. Scheduled  at 8am, she will be 40w2d.  [x] Mode of delivery: Plan for            Vaginal irritation 10/30/2023 by Michelle Bartlett MD 2023  by Michelle Bartlett MD            Subjective   Pt having more intense ctx. Feeling vaginal pressure    Objective   Last Vitals:  Temp Pulse Resp BP MAP Pulse Ox   36.8 °C (98.2 °F) 109 18 117/57   (!) 92 %     Vitals Min/Max Last 24 Hours:  Temp  Min: 36.2 °C (97.2 °F)  Max: 36.8 °C (98.2 °F)  Pulse  Min: 67  Max: 116  Resp  Min: 16  Max: 18  BP  Min: 104/62  Max: 135/84    Intake/Output:    Intake/Output Summary (Last 24 hours) at 2/27/2024 0809  Last data filed at 2/27/2024 0524  Gross per 24 hour   Intake --   Output 750 ml   Net -750 ml       Physical Examination:  Cervical Exam  Dilation: 6  Effacement (%): 70  Fetal Station: -2  Method: Manual  OB Examiner: Mingo  Fetal Assessment  Movement: Present  Mode: External US  Baseline Fetal Heart Rate (bpm): 125 bpm  Baseline Classification: Normal  Variability: Moderate (Between 6 and 25 BPM)  Pattern: Accelerations  FHR Category: Category I  Multiple Births: No   Fetal Decelerations: No  Contraction Frequency: 3-5          Lab Review:  Labs in chart were reviewed.

## 2024-02-27 NOTE — SIGNIFICANT EVENT
IUPC    To room to evaluate pt due to recurrent variable decels after AROM.    SVE: 6/70/-2, active. IUPC placed  cEFM: Baseline 130, pos accels, variable decels with ctx, mod david  [place]: ctx q2-4 mins    A/P:  - Discussed with patient etiology of variable decels. Placed IUPC for ability to start amnioinfusion if needed. Pt is being actively repositioned. Will cont to monitor FHT and if variables continue to persist or become large in size will start amnioinfusion. Pt amenable to plan.  - FHT overall still reassuring due to mod variability and presence of accels. Cat II.  - Now in active labor. Will recheck cervix in 2 hrs or sooner if needed.    D/w Dr. Scotty Anderson MD PGY-2

## 2024-02-27 NOTE — L&D DELIVERY NOTE
OB Delivery Note  2024  Elda Sadler  28 y.o.   Vaginal, Spontaneous        Gestational Age: 40w2d  /Para:   Quantitative Blood Loss: Admission to Discharge: 300 mL (2024  1:40 PM - 2024  1:13 PM)    . Nuchal cord x2, reduced. . 2nd degree laceration - 10 ml lidocaine injected, repaired. R periurethral - hemostatic. Persistent bleeding and lower uterine segment atony. 2nd pit bolus and methergine given with improvement.    Ana Sadler [[id number]]      Labor Events    Sac identifier: Sac 1  Rupture date/time: 2024 0310  Rupture type: Artificial  Fluid color: Clear  Fluid odor: None  Labor type: Induced Onset of Labor  Labor allowed to proceed with plans for an attempted vaginal birth?: Yes  Induction: Misoprostol, Nicolas/EASI, Oxytocin  Induction date/time: 2024 1510  Induction indications: Risk Reducing  Complications: None       Labor Event Times    Labor onset date/time: 2024 1510  Dilation complete date/time: 2024 1117  Start pushing date/time: 2024 1120       Labor Length    1st stage: 20h 07m  2nd stage: 0h 21m  3rd stage: 0h 05m       Placenta    Placenta delivery date/time: 2024 1143  Placenta removal: Spontaneous  Placenta appearance: Intact  Placenta disposition: discarded       Cord    Vessels: 3 vessels  Complications: Nuchal  Nuchal intervention: reduced  Nuchal cord description: tight nuchal cord  Number of loops: 2  Delayed cord clamping?: Yes  Cord blood disposition: Lab  Gases sent?: No  Stem cell collection (by provider): No       Lacerations    Episiotomy: None  Perineal laceration: 1st  Perineal laceration repaired?: Yes  Repair suture: 3-0 Synthetic Suture       Anesthesia    Method: Epidural       Operative Delivery    Forceps attempted?: No  Vacuum extractor attempted?: No       Shoulder Dystocia    Shoulder dystocia present?: No        Delivery    Time head delivered: 2024 11:38:00  Birth date/time:  2024 11:38:00  Delivery type: Vaginal, Spontaneous  Complications: None       Resuscitation    Method: Suctioning, Tactile stimulation       Apgars    Living status: Living  Apgar Component Scores:  1 min.:  5 min.:  10 min.:  15 min.:  20 min.:    Skin color:  1  1       Heart rate:  2  2       Reflex irritability:  2  2       Muscle tone:  2  2       Respiratory effort:  2  2       Total:  9  9       Apgars assigned by: A QUINTEN RN       Delivery Providers    Delivering clinician: Jeremy King MD   Provider Role    Violetta Leal RN Delivery Nurse    Carol Snow, RN Nursery Nurse    Tania Aguila MD Resident    Judith Grey MD Resident                 Tania Aguila MD

## 2024-02-27 NOTE — SIGNIFICANT EVENT
"Labor Check      S: Patient feeling more pressure. Desires cervical exam        O:  /63   Pulse 92   Temp 36.4 °C (97.5 °F) (Temporal)   Resp 16   Ht 1.6 m (5' 3\")   Wt 79.1 kg (174 lb 6.1 oz)   LMP 05/21/2023   SpO2 98%   BMI 30.89 kg/m²      SVE: 6/70/-2     NST:   Baseline: 130  Variability: mod  Accels: +  Decels: recur variables    [place]: q2-4 min          A/P  -  Active Labor.   - CEFM Cat II currently given variables. IUPC currently in place. Will now start amnioinfusion.   - Pitocin currently infusing. Increase per protocol  - Continue position changes, as tolerated  - GBS negative  - Recheck as clinically indicated by maternal or fetal status or PRN   - Anticipate NSVB.      Pt. d/w Dr. Judie Crane MD  PGY-1, Obstetrics & Gynecology   [facility]       "

## 2024-02-27 NOTE — ANESTHESIA PROCEDURE NOTES
Epidural Block    Patient location during procedure: OB  Start time: 2/27/2024 1:41 AM  End time: 2/27/2024 2:14 AM  Reason for block: labor analgesia  Staffing  Performed: resident   Authorized by: Karen Lyels MD    Performed by: Karen Lyles MD    Preanesthetic Checklist  Completed: patient identified, IV checked, risks and benefits discussed, surgical consent, pre-op evaluation, timeout performed and sterile techniques followed  Block Timeout  RN/Licensed healthcare professional reads aloud to the Anesthesia provider and entire team: Patient identity, procedure with side and site, patient position, and as applicable the availability of implants/special equipment/special requirements.  Patient on coagulant treatment: no  Timeout performed at: 2/27/2024 1:41 AM  Block Placement  Patient position: sitting  Prep: ChloraPrep  Sterility prep: cap, gloves, mask, hand and drape  Sedation level: no sedation  Patient monitoring: blood pressure, continuous pulse oximetry and heart rate  Approach: midline  Local numbing: lidocaine 1% to skin and subcutaneous tissues  Vertebral space: lumbar  Epidural  Loss of resistance technique: saline  Guidance: landmark technique        Needle  Needle type: Tuohy   Needle gauge: 17  Needle length: 10 cm  Needle insertion depth: 5.5 cm  Catheter type: multi-orifice  Catheter size: 20 G  Catheter at skin depth: 10.5 cm  Catheter securement method: clear occlusive dressing and liquid medical adhesive    Test dose: lidocaine 1.5% with epinephrine 1-to-200,000  Test dose: lidocaine 1.5% with epinephrine 1-to-200,000  Test dose result: no positive test dose    PCEA  Medication concentration used: 0.044% Bupivacaine with 1.25 mcg/mL Fentanyl and [id number] Epinephrine  Dose (mL): 10  Lockout (minutes): 15  1-Hour Limit (boluses/hr): 4  Basal Rate: 14        Assessment  Sensory level: other (T9 on left, T10 on right)  Block outcome: patient comfortable  Number of attempts:  1  Events: no positive test dose  Procedure assessment: patient tolerated procedure well with no immediate complications  Additional Notes  Easy placement

## 2024-02-27 NOTE — DISCHARGE INSTRUCTIONS

## 2024-02-28 PROCEDURE — 2500000004 HC RX 250 GENERAL PHARMACY W/ HCPCS (ALT 636 FOR OP/ED)

## 2024-02-28 PROCEDURE — 2500000001 HC RX 250 WO HCPCS SELF ADMINISTERED DRUGS (ALT 637 FOR MEDICARE OP)

## 2024-02-28 PROCEDURE — 1100000001 HC PRIVATE ROOM DAILY

## 2024-02-28 RX ADMIN — IBUPROFEN 600 MG: 600 TABLET, FILM COATED ORAL at 01:40

## 2024-02-28 RX ADMIN — IBUPROFEN 600 MG: 600 TABLET, FILM COATED ORAL at 13:50

## 2024-02-28 RX ADMIN — ACETAMINOPHEN 975 MG: 325 TABLET ORAL at 19:50

## 2024-02-28 RX ADMIN — POLYETHYLENE GLYCOL 3350 17 G: 17 POWDER, FOR SOLUTION ORAL at 15:50

## 2024-02-28 RX ADMIN — ACETAMINOPHEN 975 MG: 325 TABLET ORAL at 08:16

## 2024-02-28 RX ADMIN — ACETAMINOPHEN 975 MG: 325 TABLET ORAL at 13:50

## 2024-02-28 RX ADMIN — ACETAMINOPHEN 975 MG: 325 TABLET ORAL at 01:40

## 2024-02-28 RX ADMIN — IBUPROFEN 600 MG: 600 TABLET, FILM COATED ORAL at 19:51

## 2024-02-28 RX ADMIN — IBUPROFEN 600 MG: 600 TABLET, FILM COATED ORAL at 08:16

## 2024-02-28 ASSESSMENT — PAIN SCALES - GENERAL
PAINLEVEL_OUTOF10: 2
PAINLEVEL_OUTOF10: 1
PAINLEVEL_OUTOF10: 0 - NO PAIN
PAINLEVEL_OUTOF10: 2
PAIN_LEVEL: 0
PAINLEVEL_OUTOF10: 0 - NO PAIN

## 2024-02-28 ASSESSMENT — PAIN DESCRIPTION - DESCRIPTORS: DESCRIPTORS: CRAMPING

## 2024-02-28 ASSESSMENT — PAIN - FUNCTIONAL ASSESSMENT
PAIN_FUNCTIONAL_ASSESSMENT: 0-10

## 2024-02-28 ASSESSMENT — PAIN DESCRIPTION - LOCATION: LOCATION: PERINEUM

## 2024-02-28 NOTE — CARE PLAN
Problem: Postpartum  Goal: No s/sx of hemorrhage  Outcome: Progressing  Goal: Minimal s/sx of HDP and BP<160/110  Outcome: Progressing

## 2024-02-28 NOTE — LACTATION NOTE
Lactation Consultant Note  Lactation Consultation  Reason for Consult: Follow-up assessment  Consultant Name: Pascale Sethi RN, IBCLC    Maternal Information  Has mother  before?: No  Infant to breast within first 2 hours of birth?: Yes  Exclusive Pump and Bottle Feed: No    Maternal Assessment  Breast Assessment: Medium, Symmetrical, Soft  Nipple Assessment: Intact, Erect, Bruised  Alterations in Nipple Condition: Stage I - pain or irritation with no skin break down  Areola Assessment: Other (Comment), Normal (edema noted)    Infant Assessment  Infant Behavior: Awake, Feeding cues observed  Infant Assessment: Post status frenotomy, Good cupping of tongue    Feeding Assessment  Nutrition Source: Breastmilk  Feeding Method: Nursing at the breast  Feeding Position: Football/seated  Suck/Feeding: Sustained, Audible swallowing, Tactile stimulation needed, Audible swallowing with stimulaton  Latch Assessment: Minimal assistance is needed, Instructed on deep latch, Eagerly grasped on to latch, Areolar attachment, Optimal angle of mouth opening, Flanged lips, Chin moves in rhythmic motion    LATCH TOOL  Latch: Grasps breast, tongue down, lips flanged, rhythmic sucking  Audible Swallowing: Spontaneous and intermittent (24 hours old)  Type of Nipple: Everted (After stimulation)  Comfort (Breast/Nipple): Filling, red/small blisters/bruises, mild/moderate discomfort  Hold (Positioning): Minimal assist, teach one side, mother does other, staff holds  LATCH Score: 8    Breast Pump  Pump: Hospital grade electric pump  Frequency: 5-7 times per day  Duration: 15-20 minutes per session  Breast Shield Size and Type: 21 mm  Units of Volume: Drops    Other OB Lactation Tools  Lactation Tools: Shells    Patient Follow-up  Inpatient Lactation Follow-up Needed : Yes  Outpatient Lactation Follow-up: Recommended    Other OB Lactation Documentation   Assisted mom to get baby latched, skin to skin, football hold on her left breast.  Mom's areolas were edematous, so I educated her on reverse pressure softening and she was able to notice the difference before and after the softening techniques.  Her colostrum is expressible and once softened, her areola was compressible and nipple erect.    Recommendations/Summary  Baby was able to latch on eagerly. We reviewed hand placement on the breast and guiding the baby towards mom to come in for a deep latch. Emphasized the need to compress the areola to ensure a deep latch every time.      Baby was able to sustain a good suck pattern and had several swallows over the course of a 10min feed. Baby was relaxed and content after feed. Encouraged feeding on demand or every 3hrs if baby is sleepy.  Encouraged mom to pump after any feeds that don't last for at least 30mins, including after the feeding we did together and mom stated understanding.     Therashells were given for mom to try to decrease her areolar swelling, also reviewed massage methods with hands to achieve this softening.  Encouraged mom to call for feeding assistance as needed throughout stay. Mom has info for outpatient follow up as well.

## 2024-02-28 NOTE — PROGRESS NOTES
Postpartum Progress Note      Assessment/Plan     Elda Sadler is a 28 y.o.,  initially presented for  LOF  She had a Vaginal, Spontaneous   delivery on 2024  at 40w2d and is now postpartum day 1.    #Postpartum  - continue routine postpartum care  - pain well controlled on po medications  - DVT Score: 3 ; SCDs  - The patient's blood type is B POS. Rhogam is not indicated.    #Maternal Well-Being  - emotional support provided  - bonding with infant  - Contraception: POPs @ PPV.   - We discussed the need to take the pill at exactly the same time everyday and if >2hrs late a backup method must be used for 1 week. Pt verbalized understanding.     #[place] Feeding  - breastfeeding/pumping encouraged; lactation consult prn    #Dispo  - anticipate d/c on PPD #2 if continuing to meet all postpartum milestones  - for f/u 4-6 weeks with Primary OB provider      Principal Problem:    Labor and delivery indication for care or intervention  Active Problems:    Gastroesophageal reflux disease         Subjective   Pt seen ambulating in hallway with baby in bassinet. Smiling, feels well. Denies complaints at this time.     Meeting all postpartum milestones- ambulating independently, passing flatus, tolerating PO intake, lochia light, voiding spontaneously, and pain well controlled with PO meds.    Objective   Physical Exam:  General: well appearing, well nourished, postpartum  Obstetric: fundus firm below umbilicus, lochia light  Skin: Warm, dry; no rashes/lesions/erythema  Breast: No masses, nipple discharge  Neuro: A/Ox3, no gross motor deficit   GI: no distension, appropriately tender, soft, +BS  Respiratory: Even and unlabored on RA, LSCTA BL  Cardiovascular: Trace BLE edema; No erythema, warmth  Psych: appropriate mood and affect, conversational    Last Vitals:  Temp Pulse Resp BP MAP Pulse Ox   36.3 °C (97.3 °F) 99 16 106/70   95 %       Vitals Min/Max Last 24 Hours:  Temp  Min: 36.1 °C (97 °F)  Max: 36.7 °C  (98.1 °F)  Pulse  Min: 83  Max: 110  Resp  Min: 15  Max: 16  BP  Min: 99/66  Max: 127/84    Lab Data:  Lab Results   Component Value Date    WBC 10.1 02/26/2024    HGB 12.4 02/26/2024    HCT 35.1 (L) 02/26/2024     02/26/2024

## 2024-02-28 NOTE — LACTATION NOTE
Lactation Consultant Note  Lactation Consultation  Reason for Consult: Follow-up assessment, Breast/nipple pain, Other (Comment) (Pumping)  Consultant Name: Jessie Morales RN, IBCLC    Maternal Information  Has mother  before?: No  Infant to breast within first 2 hours of birth?: Yes  Exclusive Pump and Bottle Feed: No (but will pump while not latching d/t pain with latch-possible tight frenulum)    Maternal Assessment  Breast Assessment: Medium, Symmetrical, Soft  Nipple Assessment: Intact, Erect, Red/inflamed, Sore, Bruised, Other (Comment) (tips of nipples reddened/ slightly bruised -)  Alterations in Nipple Condition: Stage I - pain or irritation with no skin break down  Areola Assessment: Normal    Infant Assessment  Infant Behavior: Deep sleep, Other (Comment) (already fed her own expressed breast milk and donor breast milk)  Infant Assessment: Other (Comment) (per parents- tight frenulum- PEDS aware- I did not assess at this time.)    Feeding Assessment  Nutrition Source: Breastmilk, Donor human milk  Feeding Method: Nursing at the breast, Paced bottle, Feeding expressed breastmilk, Syringe feeding    LATCH TOOL       Breast Pump  Pump: Hospital grade electric pump, Double breast pumping, Hand expression, Massage  Frequency: Unable to recall  Duration: 15-20 minutes per session  Breast Shield Size and Type: 21 mm (she needs a 19 MM breast flange or inserts d/t her nipple diameter measure to be 17 MM and 18 MM)  Units of Volume: mL per session    Other OB Lactation Tools  Lactation Tools: Flanges, Lanolin    Patient Follow-up  Inpatient Lactation Follow-up Needed : Yes  Outpatient Lactation Follow-up: Recommended    Other OB Lactation Documentation  Infant Risk Factors: Other (comment) (tight frenulum)    Recommendations/Summary  I did not view a latch at this time.   Mom had already fed the baby her own expressed colostrum and donor breast milk at 9. Baby appears content at this time.      Mom stated she hadn't been latching the baby to the breast d/t pain with the latch. She was told the baby has a possible tight frenulum (Peds aware).   And she hasn't been pumping d/t she needs smaller flanges than we have in the hospital; therefore she has been hand expressing and feeding the hand expressed colostrum to the baby via syringe. Reviewed the importance of nipple stimulation and breast compression (removal of colostrum ) for long term good milk supply. Showed mom how to use lanolin on areola with pumping to decreased skin rubbing on sides. If still too painful- Do breast massage and hand express.    Reviewed the difference between latching and pumping, the benefit of skin to skin, the benefits of breast massage prior to pumping, expectations of volume with pumping, milk storage and cleaning of pump parts.   PI-123 and [facility] pump cleaning guide given.     Reviewed feeding cues, the normal feeding patterns in the first 24 hours of life, the role of colostrum, output on different days of life, milk production/ supply in the first few days of life, and the benefits of skin to skin.      Encouraged her to pump every 3 hours (8-12 times in a 24 hour period) for 20 minutes on both breasts and to give the baby any pumped breast milk prior to any use of supplement.  I advised her to do breast massage prior to pumping and hand expression after pumping.     Peds  here to do evaluation/ assess the baby.     Encouraged her to call for assistance with feeds.     Encouraged her to utilize the outpatient lactation resources, if needed.   Contact information given.   [phone] [facility] or [phone] [place]      Mom has a [employer] breast pump for at home.

## 2024-02-28 NOTE — ANESTHESIA POSTPROCEDURE EVALUATION
Patient: Elda Sadler    Procedure Summary       Date: 24 Room / Location:     Anesthesia Start: 0141 Anesthesia Stop: 1138    Procedure: Labor Analgesia Diagnosis:     Scheduled Providers:  Responsible Provider: Rakan Frank MD    Anesthesia Type: epidural ASA Status: 2          Elda Sadler is a 28 y.o., , who had a Vaginal, Spontaneous  delivery on 2024  at 40w2d and is now POD1.    She had Neuraxial Anesthesia without immediate complications noted.       Pain well controlled    Vitals:    24 0814   BP: 102/65   Pulse: 96   Resp: 16   Temp: 36.6 °C (97.9 °F)   SpO2: 97%       Neuraxial site assessed. No visible redness or swelling or drainage. Patient able to ambulate and move all extremities without difficulty. Able to void. No complaints of nausea/vomiting. Tolerating PO intake well. No s/sx of PDPH.     Anesthesia will sign off     Haley Hollis MD    Anesthesia Type: epidural    Anesthesia Post Evaluation    Patient location during evaluation: floor  Patient participation: complete - patient participated  Level of consciousness: awake  Pain score: 0  Pain management: adequate  Airway patency: patent  Cardiovascular status: acceptable and hemodynamically stable  Respiratory status: acceptable and room air  Hydration status: acceptable  Postoperative Nausea and Vomiting: none        No notable events documented.

## 2024-02-29 VITALS
RESPIRATION RATE: 18 BRPM | OXYGEN SATURATION: 95 % | HEIGHT: 63 IN | DIASTOLIC BLOOD PRESSURE: 75 MMHG | WEIGHT: 174.38 LBS | BODY MASS INDEX: 30.9 KG/M2 | HEART RATE: 88 BPM | TEMPERATURE: 98.4 F | SYSTOLIC BLOOD PRESSURE: 114 MMHG

## 2024-02-29 PROCEDURE — 2500000001 HC RX 250 WO HCPCS SELF ADMINISTERED DRUGS (ALT 637 FOR MEDICARE OP)

## 2024-02-29 RX ORDER — IBUPROFEN 600 MG/1
600 TABLET ORAL EVERY 6 HOURS
Qty: 30 TABLET | Refills: 0 | Status: SHIPPED | OUTPATIENT
Start: 2024-02-29

## 2024-02-29 RX ORDER — ACETAMINOPHEN 325 MG/1
975 TABLET ORAL EVERY 6 HOURS
Qty: 90 TABLET | Refills: 0 | Status: SHIPPED | OUTPATIENT
Start: 2024-02-29

## 2024-02-29 RX ADMIN — ACETAMINOPHEN 975 MG: 325 TABLET ORAL at 01:56

## 2024-02-29 RX ADMIN — IBUPROFEN 600 MG: 600 TABLET, FILM COATED ORAL at 08:39

## 2024-02-29 RX ADMIN — ACETAMINOPHEN 975 MG: 325 TABLET ORAL at 08:39

## 2024-02-29 RX ADMIN — IBUPROFEN 600 MG: 600 TABLET, FILM COATED ORAL at 01:55

## 2024-02-29 ASSESSMENT — PAIN - FUNCTIONAL ASSESSMENT: PAIN_FUNCTIONAL_ASSESSMENT: 0-10

## 2024-02-29 ASSESSMENT — PAIN SCALES - GENERAL
PAINLEVEL_OUTOF10: 0 - NO PAIN
PAINLEVEL_OUTOF10: 1
PAINLEVEL_OUTOF10: 2

## 2024-02-29 ASSESSMENT — PAIN DESCRIPTION - DESCRIPTORS
DESCRIPTORS: CRAMPING
DESCRIPTORS: SORE

## 2024-02-29 NOTE — DISCHARGE SUMMARY
Discharge Summary    Admission Date: 2024  Discharge Date: 24      Discharge Diagnosis   (normal spontaneous vaginal delivery)    Hospital Course  Delivery Date: 2024  11:38 AM   Delivery type: Vaginal, Spontaneous    GA at delivery: 40w2d  Outcome: Living   Anesthesia during delivery: Epidural   Intrapartum complications: None   Feeding method: Breastfeeding Status: Yes     Procedures: none  Contraception at discharge: none  Ready for discharge.  No questions    Discharge Meds     Your medication list        START taking these medications        Instructions Last Dose Given Next Dose Due   acetaminophen 325 mg tablet  Commonly known as: Tylenol      Take 3 tablets (975 mg) by mouth every 6 hours.       ibuprofen 600 mg tablet      Take 1 tablet (600 mg) by mouth every 6 hours.              STOP taking these medications      PRENATAL 19 ORAL                  Where to Get Your Medications        These medications were sent to [id number] - [facility] [address]  [address] [place] [address]      Phone: [phone]   acetaminophen 325 mg tablet  ibuprofen 600 mg tablet          Complications Requiring Follow-Up  Heavy vaginal bleeding, passing clots, fever and/or chills      Test Results Pending At Discharge  Pending Labs       No current pending labs.            Outpatient Follow-Up  No future appointments.    I spent 3 minutes in the professional and overall care of this patient.      Abbie Richmond, CLIFF-CNP

## 2024-02-29 NOTE — LACTATION NOTE
Lactation Consultant Note  Lactation Consultation  Reason for Consult: Follow-up assessment, Difficult latch, Other (Comment) (patient request)  Consultant Name: Jessie Morales, RN, IBCLC    Maternal Information       Maternal Assessment  Breast Assessment: Medium, Symmetrical, Soft  Nipple Assessment: Intact, Erect, Sore  Areola Assessment: Normal, Other (Comment) (shwed reverse pressure softening)    Infant Assessment  Infant Behavior: Awake, Readiness to feed, Suckles on and off, needs stimulation, Content after feeding  Infant Assessment: Post status frenotomy    Feeding Assessment  Nutrition Source: Breastmilk  Feeding Method: Nursing at the breast  Feeding Position: Football/seated, C - hold, One side per feeding, Nipple to nose, Mother needs assistance with latch/positioning  Suck/Feeding: Sustained, Coordinated suck/swallow/breathe, Tactile stimulation needed, Audible swallowing with stimulaton  Latch Assessment: Minimal assistance is needed, Instructed on deep latch, Areolar attachment, Deep latch obtained, Wide open mouth < 160, Flanged lips, Comfortable latch    LATCH TOOL  Latch: Grasps breast, tongue down, lips flanged, rhythmic sucking  Audible Swallowing: Spontaneous and intermittent (24 hours old)  Type of Nipple: Everted (After stimulation)  Comfort (Breast/Nipple): Soft/non-tender  Hold (Positioning): Minimal assist, teach one side, mother does other, staff holds  LATCH Score: 9    Breast Pump  Pump: Hospital grade electric pump, Single breast pumping  Frequency:  (encouraged her to pump the one breast if the baby doesn't latch to the other side)  Breast Shield Size and Type: 21 mm    Other OB Lactation Tools       Patient Follow-up  Outpatient Lactation Follow-up: Recommended    Other OB Lactation Documentation  Infant Risk Factors: Other (comment) (tight frenulum- released)    Recommendations/Summary  In to work with mom at her request.   Baby's tight frenulum was released yesterday and  mom stated she has been latching since then but, the baby doesn't stay on the breast for long.     Offered to assist with a feeding and mom was receptive.   Reviewed positioning of baby (in football hold on the right breast),  use of pillow support, hand placement on baby and on breast, expression of colostrum to the nipple prior to latching (mom very expressible),  latching technique, and use of breast compression and stimulation to keep the baby feeding at the breast longer.    Deep latch obtained and mom stated she had no pain with the latch (after minimal adjustment). Noted swallows with stimulation.   Mom and Dad pleased with how the feeding went.   Baby came off and was satisfied  Encouraged mom to pump the other breast if the baby doesn't wake to go to the other breast.     Reviewed feeding cues, breat feeding on demand, output on different days of life, average percentage of weight loss, milk production/ supply, and the other breastfeeding education topics.      Encouraged mom to breastfeed on demand with a goal of 8-12 feeds in a 24 hour period.   If baby is not showing feeding cues and it has been 3 hours since the last time the baby was fed or the last time the baby attempted to feed, encouraged her to place baby in skin to skin.  .   If the baby doesn't latch to the breast; encouraged mom to pump either both or one side (if doesn't latch to both) and feed the baby the pumped breast milk.     Mom has a breast pump for at home.     Questions answered     Mom anticipates discharge home today.   Encouraged her to utilize the outpatient lactation resources, if needed.     Contact information given.   [phone] [facility] or [phone] [place]

## 2024-02-29 NOTE — CARE PLAN
Problem: Postpartum  Goal: No s/sx of hemorrhage  Outcome: Met  Goal: Minimal s/sx of HDP and BP<160/110  Outcome: Met   The patient's goals for the shift include Help Breastfeed, Prepare for discharge    The clinical goals for the shift include Stable vital signs and bonding with baby    Patient has had stable vital signs and assessments, pain well controlled, and bleeding has been appropriate this shift. Stable for discharge.

## 2024-02-29 NOTE — CARE PLAN
Problem: Postpartum  Goal: No s/sx of hemorrhage  Outcome: Progressing     Problem: Postpartum  Goal: Minimal s/sx of HDP and BP<160/110  Outcome: Progressing

## 2024-03-18 ENCOUNTER — ROUTINE PRENATAL (OUTPATIENT)
Dept: OBSTETRICS AND GYNECOLOGY | Facility: CLINIC | Age: 29
End: 2024-03-18
Payer: COMMERCIAL

## 2024-03-18 DIAGNOSIS — Z30.9 ENCOUNTER FOR CONTRACEPTIVE MANAGEMENT, UNSPECIFIED TYPE: ICD-10-CM

## 2024-03-18 PROBLEM — Z34.02 ENCOUNTER FOR SUPERVISION OF NORMAL FIRST PREGNANCY IN SECOND TRIMESTER (HHS-HCC): Status: RESOLVED | Noted: 2023-10-30 | Resolved: 2024-03-18

## 2024-03-18 PROCEDURE — 0503F POSTPARTUM CARE VISIT: CPT | Performed by: OBSTETRICS & GYNECOLOGY

## 2024-03-18 NOTE — PROGRESS NOTES
3 week PPV.   EPDS= 0  BP: 110/70  WT: 160LB    Infant Information:  Baby Name: Frederick  Baby Weight: 6lb 15oz   Complications: N/A  Infant feeding: Breastfeeding  Circumcised: Yes    Reproduction History:   Birth control method: Wants to out on OCPs    Symptoms: N/A     Problem Details: N/A

## 2024-03-18 NOTE — PROGRESS NOTES
Assessment   IMPRESSIONS:  1. Encounter for postpartum visit  - doing well overall  - laceration still healing, discussed 2 more weeks of pelvic rest. If any concerns then schedule a follow up visit.     2. Encounter for contraceptive management, unspecified type  - reports that she already has POP at home, will let me know if she needs refills    Follow up for preventative visit in 1 year or sooner CHERELLE Bartlett MD    Subjective   29 y.o.  presenting for postpartum follow-up     Delivery Date: 24  GA at Delivery: 40w2d  Type of Delivery:     Pregnancy/delivery notable for: Uncomplicated delivery, spontaneous labor    Concerns: None    Lacerations: Second degree lac - repaired, still sore per patient but improving  Lochia: slight mucous spotting  Menses: None  Sexual Intimacy: not yet  Contraceptive Method: Planning for POP  Feeding Method: Breastfeeding  Lactation Consult Needed?: Denies  Bonding with Baby: well   Mood:   reports mood fluctuations the first 2 weeks which have now improved  Pap: ASCUS/HPV neg in 2023, repeat 3 years    Objective   /70    PHYSICAL EXAM  Objective   LMP 2023      General:   Alert and oriented, in no acute distress           Abdomen: Soft, non-tender, without masses or organomegaly   Vulva: Normal architecture without erythema, masses, or lesions.    Vagina: Normal mucosa without lesions, masses, or atrophy. Laceration intact, suture material still present   Cervix: deferred   Uterus: deferred   Adnexa: deferred   Pelvic Floor No POP noted.    Psych Normal affect. Normal mood.

## 2024-03-22 DIAGNOSIS — Z30.9 ENCOUNTER FOR CONTRACEPTIVE MANAGEMENT, UNSPECIFIED TYPE: Primary | ICD-10-CM
